# Patient Record
Sex: MALE | Race: WHITE | Employment: STUDENT | URBAN - NONMETROPOLITAN AREA
[De-identification: names, ages, dates, MRNs, and addresses within clinical notes are randomized per-mention and may not be internally consistent; named-entity substitution may affect disease eponyms.]

---
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
## 2024-01-04 ENCOUNTER — TELEMEDICINE (OUTPATIENT)
Dept: FAMILY MEDICINE CLINIC | Age: 19
End: 2024-01-04
Payer: COMMERCIAL

## 2024-01-04 DIAGNOSIS — R00.2 PALPITATIONS: Primary | ICD-10-CM

## 2024-01-04 DIAGNOSIS — R42 DIZZINESS: ICD-10-CM

## 2024-01-04 DIAGNOSIS — D17.9 LIPOMA, UNSPECIFIED SITE: ICD-10-CM

## 2024-01-04 PROCEDURE — G8427 DOCREV CUR MEDS BY ELIG CLIN: HCPCS | Performed by: NURSE PRACTITIONER

## 2024-01-04 PROCEDURE — 99214 OFFICE O/P EST MOD 30 MIN: CPT | Performed by: NURSE PRACTITIONER

## 2024-01-04 ASSESSMENT — ENCOUNTER SYMPTOMS
DIARRHEA: 0
CHEST TIGHTNESS: 0
ABDOMINAL PAIN: 0
VOMITING: 0
CONSTIPATION: 0
EYE DISCHARGE: 0
COUGH: 0
SHORTNESS OF BREATH: 0
RHINORRHEA: 0

## 2024-01-04 NOTE — PROGRESS NOTES
perception normal.         Mood and Affect: Mood and affect normal.         Speech: Speech normal.         Behavior: Behavior normal. Behavior is cooperative.         Thought Content: Thought content normal.         Cognition and Memory: Cognition and memory normal.         Judgment: Judgment normal.                  --SOLO KO - CNP

## 2024-01-22 NOTE — PROGRESS NOTES
history of bleeding disorders or complications with anesthesia.  Patient without any cardiac or lung disorders.  No other questions or concerns noted at this time.     Subjective:      REVIEW OF SYSTEMS:    Pertinent positives as noted in the HPI. All other systems reviewed and negative.    ALLERGIES:  Patient has no known allergies.    Past Medical History:  History reviewed. No pertinent past medical history.    PSM:  Past Surgical History:   Procedure Laterality Date    LIPOMA RESECTION  06/18/2021    excision of right bicep and right forearm lipomas in office-Dr. Crabtree    UPPER GASTROINTESTINAL ENDOSCOPY  10/2020       Family History:       Problem Relation Age of Onset    Diabetes Mother         Insulin resistance    High Cholesterol Mother     Obesity Mother     High Cholesterol Father     Diabetes Maternal Grandmother     Heart Disease Maternal Grandmother     High Blood Pressure Maternal Grandfather     Cancer Paternal Grandmother         uterine    Cancer Paternal Grandfather 63        Stomach       Surgical History:  Past Surgical History:   Procedure Laterality Date    LIPOMA RESECTION  06/18/2021    excision of right bicep and right forearm lipomas in office-Dr. Crabtree    UPPER GASTROINTESTINAL ENDOSCOPY  10/2020        MEDICATIONS:  Current Outpatient Medications   Medication Sig Dispense Refill    fluticasone (FLONASE) 50 MCG/ACT nasal spray 2 sprays by Nasal route daily Apply daily to each nare (Patient not taking: Reported on 1/23/2024) 16 g 0     No current facility-administered medications for this visit.       Objective:   /74   Pulse 94   Temp 99 °F (37.2 °C) (Infrared)   Resp 18   Ht 1.702 m (5' 7\")   Wt 75.1 kg (165 lb 9.6 oz)   SpO2 99%   BMI 25.94 kg/m²     PHYSICAL EXAM  Constitutional: Oriented and cooperative. Appears well-developed and well-nourished. No distress. Voice is not hypo or hyper-nasal in quality. No frequent throat clearing or cough noted.  HENT:   Head:

## 2024-01-23 ENCOUNTER — OFFICE VISIT (OUTPATIENT)
Dept: ENT CLINIC | Age: 19
End: 2024-01-23
Payer: COMMERCIAL

## 2024-01-23 ENCOUNTER — OFFICE VISIT (OUTPATIENT)
Dept: SURGERY | Age: 19
End: 2024-01-23
Payer: COMMERCIAL

## 2024-01-23 VITALS
BODY MASS INDEX: 26.82 KG/M2 | OXYGEN SATURATION: 99 % | HEART RATE: 91 BPM | HEIGHT: 67 IN | SYSTOLIC BLOOD PRESSURE: 118 MMHG | WEIGHT: 170.9 LBS | DIASTOLIC BLOOD PRESSURE: 64 MMHG | RESPIRATION RATE: 16 BRPM | TEMPERATURE: 97.5 F

## 2024-01-23 VITALS
HEART RATE: 94 BPM | WEIGHT: 165.6 LBS | OXYGEN SATURATION: 99 % | RESPIRATION RATE: 18 BRPM | SYSTOLIC BLOOD PRESSURE: 118 MMHG | BODY MASS INDEX: 25.99 KG/M2 | DIASTOLIC BLOOD PRESSURE: 74 MMHG | HEIGHT: 67 IN | TEMPERATURE: 99 F

## 2024-01-23 DIAGNOSIS — R06.5 MOUTH BREATHING: ICD-10-CM

## 2024-01-23 DIAGNOSIS — J34.2 NASAL SEPTAL DEVIATION: ICD-10-CM

## 2024-01-23 DIAGNOSIS — J34.89 NASAL OBSTRUCTION: Primary | ICD-10-CM

## 2024-01-23 DIAGNOSIS — G47.30 SLEEP-DISORDERED BREATHING: ICD-10-CM

## 2024-01-23 DIAGNOSIS — R06.83 SNORING: ICD-10-CM

## 2024-01-23 DIAGNOSIS — D17.9 LIPOMA, UNSPECIFIED SITE: Primary | ICD-10-CM

## 2024-01-23 PROCEDURE — G8427 DOCREV CUR MEDS BY ELIG CLIN: HCPCS | Performed by: REGISTERED NURSE

## 2024-01-23 PROCEDURE — G8419 CALC BMI OUT NRM PARAM NOF/U: HCPCS | Performed by: REGISTERED NURSE

## 2024-01-23 PROCEDURE — G8484 FLU IMMUNIZE NO ADMIN: HCPCS | Performed by: REGISTERED NURSE

## 2024-01-23 PROCEDURE — G8427 DOCREV CUR MEDS BY ELIG CLIN: HCPCS | Performed by: SURGERY

## 2024-01-23 PROCEDURE — G8484 FLU IMMUNIZE NO ADMIN: HCPCS | Performed by: SURGERY

## 2024-01-23 PROCEDURE — 1036F TOBACCO NON-USER: CPT | Performed by: REGISTERED NURSE

## 2024-01-23 PROCEDURE — 1036F TOBACCO NON-USER: CPT | Performed by: SURGERY

## 2024-01-23 PROCEDURE — 99204 OFFICE O/P NEW MOD 45 MIN: CPT | Performed by: REGISTERED NURSE

## 2024-01-23 PROCEDURE — 99213 OFFICE O/P EST LOW 20 MIN: CPT | Performed by: SURGERY

## 2024-01-23 PROCEDURE — G8419 CALC BMI OUT NRM PARAM NOF/U: HCPCS | Performed by: SURGERY

## 2024-01-23 NOTE — PROGRESS NOTES
[facility]  [facility] GENERAL SURGERY  [address]  [place] [address]  [phone]  Established Patient Evaluation in Office    Pt Name: Ender Jacobo  Date of Birth 2005   Today's Date: 1/23/2024  Medical Record Number: [id number]  Referring Provider: No ref. provider found  Primary Care Provider: Ivelisse Dang APRN - CNP  Chief Complaint   Patient presents with    Surgical Consult     Est pt seen 2021 refer KENAN Dang-Multiple lipomas right arm, abdomen, left high, back     ASSESSMENT       Diagnosis Orders   1. Lipoma, unspecified site      right thigh, left thigh, right upper arm, left upper arm, chest, LLQ abdomen, right back      History reviewed. No pertinent past medical history.       PLANS      Schedule Ender for excision of multiple lipomas  The risks complications and benefits of the procedure were discussed with the patient including, but not limited to, infection, bleeding, recurrence, injury to adjacent tissues or organs and open wounds. The patient was given an opportunity to ask questions. Once answered, the patient is agreeable to proceed with the procedure.  Status: outpatient  Planned anesthesia: MAC  He will undergo pre-operative clearance per anesthesia guidelines with risk factors listed under the past medical history diagnosis & problem list.       SUBJECTIVE      Ender is a 18 y.o. male seen in the consultation for evaluation of a subcutaneous masses. The masses were first noted several months ago. The masses are not tender. It gradually enlarging since it was first noticed. He denies fatigue, night sweats, weight loss, recent or current infections, immunosuppression, and personal history of malignancy. Has had previous lipomas removed.  Past Medical History  History reviewed. No pertinent past medical history.  Past Surgical History  Past Surgical History:   Procedure Laterality Date    LIPOMA RESECTION  06/18/2021    excision of right

## 2024-01-26 ENCOUNTER — TELEPHONE (OUTPATIENT)
Dept: SURGERY | Age: 19
End: 2024-01-26

## 2024-01-31 ENCOUNTER — HOSPITAL ENCOUNTER (OUTPATIENT)
Dept: CT IMAGING | Age: 19
Discharge: HOME | End: 2024-01-31
Attending: REGISTERED NURSE
Payer: COMMERCIAL

## 2024-01-31 DIAGNOSIS — J34.2 NASAL SEPTAL DEVIATION: ICD-10-CM

## 2024-01-31 DIAGNOSIS — J34.89 NASAL OBSTRUCTION: ICD-10-CM

## 2024-01-31 DIAGNOSIS — G47.30 SLEEP-DISORDERED BREATHING: ICD-10-CM

## 2024-01-31 DIAGNOSIS — R06.83 SNORING: ICD-10-CM

## 2024-01-31 DIAGNOSIS — R06.5 MOUTH BREATHING: ICD-10-CM

## 2024-01-31 PROCEDURE — 70486 CT MAXILLOFACIAL W/O DYE: CPT

## 2024-02-01 ENCOUNTER — HOSPITAL ENCOUNTER (OUTPATIENT)
Age: 19
Setting detail: SPECIMEN
Discharge: HOME | End: 2024-02-01

## 2024-02-01 ENCOUNTER — NURSE ONLY (OUTPATIENT)
Dept: FAMILY MEDICINE CLINIC | Age: 19
End: 2024-02-01

## 2024-02-01 ENCOUNTER — PATIENT MESSAGE (OUTPATIENT)
Dept: FAMILY MEDICINE CLINIC | Age: 19
End: 2024-02-01

## 2024-02-01 DIAGNOSIS — R42 DIZZINESS: ICD-10-CM

## 2024-02-01 DIAGNOSIS — R00.2 PALPITATIONS: ICD-10-CM

## 2024-02-01 DIAGNOSIS — R44.2 TACTILE HALLUCINATIONS: Primary | ICD-10-CM

## 2024-02-01 NOTE — TELEPHONE ENCOUNTER
From: Ender Jacobo  To: Ivelisse Dang  Sent: 2/1/2024 10:28 AM EST  Subject: Bloodwork?    Ashley Oviedo, I was just wanting to reach out and say I’ve still had some ongoing symptoms since we’ve last talked on the virtual appointment. I still feel foggy most days, fatigued more often, some days very dizzy and other days just slightly. The only thing that’s gotten better was I haven’t had any headaches the past few weeks. I was curious if maybe we could get some blood work done to see if there might be a problem stemming from there? A deficiency possibly or just to make sure there are no signs of cancer being a possibility. Thanks!

## 2024-02-02 LAB
ALBUMIN SERPL-MCNC: 4.6 G/DL (ref 3.5–5.2)
ALBUMIN/GLOB SERPL: 2 {RATIO} (ref 1–2.5)
ALP SERPL-CCNC: 90 U/L (ref 40–129)
ALT SERPL-CCNC: 16 U/L (ref 10–50)
ANION GAP SERPL CALCULATED.3IONS-SCNC: 11 MMOL/L (ref 9–16)
AST SERPL-CCNC: 25 U/L (ref 10–50)
BASOPHILS # BLD: 0.05 K/UL (ref 0–0.2)
BASOPHILS NFR BLD: 1 % (ref 0–2)
BILIRUB SERPL-MCNC: 0.5 MG/DL (ref 0–1.2)
BUN SERPL-MCNC: 12 MG/DL (ref 6–20)
CALCIUM SERPL-MCNC: 9.6 MG/DL (ref 8.6–10.4)
CHLORIDE SERPL-SCNC: 104 MMOL/L (ref 98–107)
CO2 SERPL-SCNC: 24 MMOL/L (ref 20–31)
CREAT SERPL-MCNC: 1.2 MG/DL (ref 0.7–1.2)
EOSINOPHIL # BLD: 0.1 K/UL (ref 0–0.44)
EOSINOPHILS RELATIVE PERCENT: 1 % (ref 1–4)
ERYTHROCYTE [DISTWIDTH] IN BLOOD BY AUTOMATED COUNT: 12.8 % (ref 11.8–14.4)
GFR SERPL CREATININE-BSD FRML MDRD: >60 ML/MIN/1.73M2
GLUCOSE SERPL-MCNC: 95 MG/DL (ref 74–99)
HCT VFR BLD AUTO: 46.6 % (ref 40.7–50.3)
HGB BLD-MCNC: 15.5 G/DL (ref 13–17)
IMM GRANULOCYTES # BLD AUTO: <0.03 K/UL (ref 0–0.3)
IMM GRANULOCYTES NFR BLD: 0 %
LYMPHOCYTES NFR BLD: 2.9 K/UL (ref 1.2–5.2)
LYMPHOCYTES RELATIVE PERCENT: 36 % (ref 25–45)
MCH RBC QN AUTO: 27.6 PG (ref 25.2–33.5)
MCHC RBC AUTO-ENTMCNC: 33.3 G/DL (ref 28.4–34.8)
MCV RBC AUTO: 82.9 FL (ref 82.6–102.9)
MONOCYTES NFR BLD: 0.6 K/UL (ref 0.1–1.4)
MONOCYTES NFR BLD: 8 % (ref 2–8)
NEUTROPHILS NFR BLD: 54 % (ref 34–64)
NEUTS SEG NFR BLD: 4.36 K/UL (ref 1.8–8)
NRBC BLD-RTO: 0 PER 100 WBC
PLATELET # BLD AUTO: 305 K/UL (ref 138–453)
PMV BLD AUTO: 10.4 FL (ref 8.1–13.5)
POTASSIUM SERPL-SCNC: 4.1 MMOL/L (ref 3.7–5.3)
PROT SERPL-MCNC: 7.2 G/DL (ref 6.6–8.7)
RBC # BLD AUTO: 5.62 M/UL (ref 4.21–5.77)
SODIUM SERPL-SCNC: 139 MMOL/L (ref 136–145)
TSH SERPL DL<=0.05 MIU/L-ACNC: 2.17 UIU/ML (ref 0.27–4.2)
WBC OTHER # BLD: 8 K/UL (ref 4.5–13.5)

## 2024-02-07 ENCOUNTER — PATIENT MESSAGE (OUTPATIENT)
Dept: FAMILY MEDICINE CLINIC | Age: 19
End: 2024-02-07

## 2024-02-07 ENCOUNTER — OFFICE VISIT (OUTPATIENT)
Dept: FAMILY MEDICINE CLINIC | Age: 19
End: 2024-02-07
Payer: COMMERCIAL

## 2024-02-07 VITALS
BODY MASS INDEX: 26.95 KG/M2 | SYSTOLIC BLOOD PRESSURE: 110 MMHG | WEIGHT: 172.1 LBS | TEMPERATURE: 97.9 F | DIASTOLIC BLOOD PRESSURE: 74 MMHG | RESPIRATION RATE: 16 BRPM | OXYGEN SATURATION: 96 % | HEART RATE: 92 BPM

## 2024-02-07 DIAGNOSIS — R09.81 NASAL CONGESTION: Primary | ICD-10-CM

## 2024-02-07 DIAGNOSIS — R68.2 DRY MOUTH: ICD-10-CM

## 2024-02-07 PROCEDURE — G8427 DOCREV CUR MEDS BY ELIG CLIN: HCPCS | Performed by: STUDENT IN AN ORGANIZED HEALTH CARE EDUCATION/TRAINING PROGRAM

## 2024-02-07 PROCEDURE — G8484 FLU IMMUNIZE NO ADMIN: HCPCS | Performed by: STUDENT IN AN ORGANIZED HEALTH CARE EDUCATION/TRAINING PROGRAM

## 2024-02-07 PROCEDURE — G8419 CALC BMI OUT NRM PARAM NOF/U: HCPCS | Performed by: STUDENT IN AN ORGANIZED HEALTH CARE EDUCATION/TRAINING PROGRAM

## 2024-02-07 PROCEDURE — 1036F TOBACCO NON-USER: CPT | Performed by: STUDENT IN AN ORGANIZED HEALTH CARE EDUCATION/TRAINING PROGRAM

## 2024-02-07 PROCEDURE — 99213 OFFICE O/P EST LOW 20 MIN: CPT | Performed by: STUDENT IN AN ORGANIZED HEALTH CARE EDUCATION/TRAINING PROGRAM

## 2024-02-07 RX ORDER — LORATADINE 10 MG/1
10 TABLET ORAL DAILY
Qty: 30 TABLET | Refills: 0 | Status: SHIPPED | OUTPATIENT
Start: 2024-02-07 | End: 2024-03-08

## 2024-02-07 RX ORDER — FLUTICASONE PROPIONATE 50 MCG
2 SPRAY, SUSPENSION (ML) NASAL DAILY
Qty: 16 G | Refills: 0 | Status: SHIPPED | OUTPATIENT
Start: 2024-02-07

## 2024-02-07 ASSESSMENT — PATIENT HEALTH QUESTIONNAIRE - PHQ9
SUM OF ALL RESPONSES TO PHQ QUESTIONS 1-9: 0
SUM OF ALL RESPONSES TO PHQ QUESTIONS 1-9: 0
1. LITTLE INTEREST OR PLEASURE IN DOING THINGS: 0
SUM OF ALL RESPONSES TO PHQ QUESTIONS 1-9: 0
2. FEELING DOWN, DEPRESSED OR HOPELESS: 0
SUM OF ALL RESPONSES TO PHQ QUESTIONS 1-9: 0
SUM OF ALL RESPONSES TO PHQ9 QUESTIONS 1 & 2: 0

## 2024-02-07 ASSESSMENT — ENCOUNTER SYMPTOMS
COUGH: 0
SHORTNESS OF BREATH: 0
EYE PAIN: 0
CONSTIPATION: 0
SINUS PRESSURE: 1
RHINORRHEA: 0
NAUSEA: 0
EYE REDNESS: 0
DIARRHEA: 0
ABDOMINAL PAIN: 0
VOMITING: 0

## 2024-02-07 NOTE — TELEPHONE ENCOUNTER
From: Ender Jacobo  To: Ivelisse Dang  Sent: 2/7/2024 2:17 PM EST  Subject: Some Symptoms still    I’ve now woke up to what I think is a swollen lymph node on the left side of my neck. It’s almost somewhat gagging me at some points. I’m still having ringing in my right ear and still slightly dizzy.

## 2024-02-07 NOTE — PROGRESS NOTES
Ender Jacobo (:  2005) is a 18 y.o. male,Established patient, here for evaluation of the following chief complaint(s):  Swelling (Swollen lymph node on the left of neck. All symptoms have been going on for a few weeks ), Shortness of Breath (Onset x with gagging. Thinks this is swelling ), and Tinnitus (Left ear - onset x 2 weeks ago )         ASSESSMENT/PLAN:  1. Nasal congestion  -     fluticasone (FLONASE) 50 MCG/ACT nasal spray; 2 sprays by Nasal route daily Apply daily to each nare, Disp-16 g, R-0Normal  -     loratadine (CLARITIN) 10 MG tablet; Take 1 tablet by mouth daily, Disp-30 tablet, R-0Normal  2. Dry mouth  18-year-old male presents the office to follow-up on concerns of lymph node swelling in left side of his neck that started today as well as intermittent shortness of breath with gagging and swelling of his sinuses with left-sided ear ringing.  Etiology patient symptoms consistent with chronic sinus findings that were seen on CT scan of his sinuses that were reviewed in detail with patient. Most recent blood work benign including thyroid function.  Patient was educated on starting Flonase, Claritin daily since he did not start this from when ENT recommended this.  Plan to start as scheduled and follow-up with ENT as scheduled next 2 to 3 weeks.  No acute concerns at this time.  Small swelling on left neck consistent with benign cervical lymph node. patient was educated on taking frequent drinks of water and prevent dehydration to treat dry mouth.      Return if symptoms worsen or fail to improve.         Subjective   SUBJECTIVE/OBJECTIVE:  18-year-old male presents the office for concerns of swelling in the left side of his neck.  Patient states he had a lymph node swelling in the left side of his neck for the past 2 weeks or so with left ear ringing during this time from. Patient states that he has experienced some shortness of breath and trouble swallowing because of this. Patient is

## 2024-02-13 ENCOUNTER — PREP FOR PROCEDURE (OUTPATIENT)
Dept: SURGERY | Age: 19
End: 2024-02-13

## 2024-02-13 RX ORDER — SODIUM CHLORIDE 0.9 % (FLUSH) 0.9 %
5-40 SYRINGE (ML) INJECTION PRN
Status: CANCELLED | OUTPATIENT
Start: 2024-02-13

## 2024-02-13 RX ORDER — SODIUM CHLORIDE 9 MG/ML
INJECTION, SOLUTION INTRAVENOUS PRN
Status: CANCELLED | OUTPATIENT
Start: 2024-02-13

## 2024-02-13 RX ORDER — SODIUM CHLORIDE 9 MG/ML
INJECTION, SOLUTION INTRAVENOUS CONTINUOUS
Status: CANCELLED | OUTPATIENT
Start: 2024-02-13

## 2024-02-13 RX ORDER — SODIUM CHLORIDE 0.9 % (FLUSH) 0.9 %
5-40 SYRINGE (ML) INJECTION EVERY 12 HOURS SCHEDULED
Status: CANCELLED | OUTPATIENT
Start: 2024-02-13

## 2024-02-14 NOTE — H&P
[facility]  [facility] GENERAL SURGERY  [address]  [place] [address]  [phone]  Established Patient Evaluation in Office    Pt Name: Ender Jacobo  Date of Birth 2005   Today's Date: 1/23/2024  Medical Record Number: [id number]  Referring Provider: No ref. provider found  Primary Care Provider: Ivelisse Dang APRN - CNP  Chief Complaint   Patient presents with    Surgical Consult     Est pt seen 2021 refer KENAN Dang-Multiple lipomas right arm, abdomen, left high, back     ASSESSMENT       Diagnosis Orders   1. Lipoma, unspecified site      right thigh, left thigh, right upper arm, left upper arm, chest, LLQ abdomen, right back      History reviewed. No pertinent past medical history.       PLANS      Schedule Ender for excision of multiple lipomas  The risks complications and benefits of the procedure were discussed with the patient including, but not limited to, infection, bleeding, recurrence, injury to adjacent tissues or organs and open wounds. The patient was given an opportunity to ask questions. Once answered, the patient is agreeable to proceed with the procedure.  Status: outpatient  Planned anesthesia: MAC  He will undergo pre-operative clearance per anesthesia guidelines with risk factors listed under the past medical history diagnosis & problem list.       SUBJECTIVE      Ender is a 18 y.o. male seen in the consultation for evaluation of a subcutaneous masses. The masses were first noted several months ago. The masses are not tender. It gradually enlarging since it was first noticed. He denies fatigue, night sweats, weight loss, recent or current infections, immunosuppression, and personal history of malignancy. Has had previous lipomas removed.  Past Medical History  History reviewed. No pertinent past medical history.  Past Surgical History  Past Surgical History:   Procedure Laterality Date    LIPOMA RESECTION  06/18/2021    excision of right  cooperative to examination with proper mood and affect.  SKIN: Skin color, texture, turgor normal. 1 cm lipomas present on right and left upper arm, chest, LLQ abdomen, right and left thigh.   HEENT: Head is normocephalic, atraumatic. EOMI, PERRLA.  NECK: Supple, symmetrical, trachea midline, thyroid symmetric, not enlarged and no tenderness, skin normal.  CHEST/LUNGS: chest symmetric with normal A/P diameter, normal respiratory rate and rhythm, lungs clear to auscultation without wheezes, rales or rhonchi. No accessory muscle use. Scars None   CARDIOVASCULAR: Heart sounds are normal.  Regular rate and rhythm without murmur, gallop or rub. Normal S1 and S2. Carotid and femoral pulses 2+/4 and equal bilaterally.  ABDOMEN: Normal shape. No scar(s) present. Normal bowel sounds.  No bruits. soft, nontender, nondistended, no masses or organomegaly. no evidence of hernia. Percussion: Normal without hepatosplenomegally.   RECTAL: deferred, not clinically indicated  NEUROLOGIC: There are no focalizing motor or sensory deficits. CN II-XII are grossly intact..   EXTREMITIES: no cyanosis, no clubbing, and no edema.  LYMPH: No cervical or inguinal lymphadenopathy.    Thank you for the interesting evaluation. Further recommendations as listed above.       Electronically signed by Mendoza Crabtree DO on 1/23/2024 at 12:07 PM      DO [facility] GENERAL SURGERY  History and Physical Update    Pt Name: Ender Jacobo  MRN: [id number]  YOB: 2005  Date of evaluation: 2/15/2024    I have examined the patient and reviewed the H&P/Consult and there are no changes to the patient or plans.         Electronically signed by Mendoza Crabtree DO on 2/15/2024 at 8:39 AM

## 2024-02-14 NOTE — OP NOTE
[facility]  RECORD OF OPERATION  PATIENT NAME: Ender Jacobo  MEDICAL RECORD NO. [id number]  SURGEON: TAMIKA Lane  Primary Care Physician: Ivelisse Dang APRN - CNP     PROCEDURE PERFORMED:  2/15/2024  PREOPERATIVE DIAGNOSIS:  Pre-Op Diagnosis Codes:     * Multiple lipomas [D17.9]   POSTOPERATIVE DIAGNOSIS: Same, path pending  PROCEDURE PERFORMED:  Excision of 1 cm lumbar lipoma, 1 cm LLQ abdominal wall lipoma, 1 cm left forearm, `1 cm left thigh, and two 1 cm lipomas right thigh   SURGEON:  Dr. Mendoza Crabtree.  ANESTHESIA:  MAC and local   ESTIMATED BLOOD LOSS:  5 ml  SPECIMEN:   lipomas sent to pathology for analysis  COMPLICATIONS:  None immediately appreciated.     DISCUSSION: Ender is a 18 y.o. year old male who presented to my office seen in evaluation at request of Ivelisse Dang APRN - CNP regarding a masses located at the above locations that have been enlarging.. After history and physical examination was performed potential diagnostic and therapeutic modalities discussed with the patient. Operative and non operative management was discussed. He was given opportunity to ask questions. Once answered informed consent was obtained. He was brought to operating room on 2/15/2024 for procedure.     OPERATIVE FINDINGS:  At time of exploration, several 1 cm lipomas were removed.     PROCEDURE:  The patient was brought to the operating room and placed in left lateral position. Placed under continuous cardiac telemetry, blood pressure, pulse oximetry monitoring and placed under MAC anesthesia by anesthesia department. The skin overlying the lesion was prepped and draped in sterile fashion. Skin edges was infiltrated with local anesthetic. A transverse incision made with a #10 scalpel blade and carried down to subcutaneous tissues. The mass was removed using a combination of sharp dissection and electrocautery. The skin closed using 3-0 Vicryl suture. The wound was then

## 2024-02-14 NOTE — DISCHARGE INSTRUCTIONS
DR. WELLS'S DISCHARGE INSTRUCTIONS    Pt Name: Ender Jacobo  Medical Record Number: [id number]  Today's Date: 2/15/2024  GENERAL ANESTHESIA OR SEDATION   1. Do not drive or operate hazardous machinery for 24 hours.  2. Do not make important business or personal decisions for 24 hours.  3. Do not drink alcoholic beverages or use tobacco for 24 hours.  ACTIVITY INSTRUCTIONS   You may resume normal to light activity tomorrow. Do not participate in activity that may place stress on your incisions.     DIET INSTRUCTIONS   Regular diet as tolerated.  MEDICATIONS   Prescription written for Norco. Take as directed.  Do not drink alcohol or drive while taking pain medications. You may experience dizziness or drowsiness with these medications. You may also experience constipation which can be relieved with stool softners or laxatives.  You may resume your daily prescription medication schedule unless otherwise specified.  Do not take 325mg Aspirin or other blood thinners such as Coumadin or Plavix for 5 days.  WOUND & DRESSING INSTRUCTIONS   Always ensure you and your care giver clean hands before and after caring for the wound.  Keep dressing clean and dry for 48 hours. Change when soiled or wet.      Ice operative site for 20 minutes 4 times a day.     May wash over incision in shower in 48 hours, but do not soak in a bath.  ABDOMINAL & LAPAROSCOPIC PROCEDURES   1. You are encouraged to get up and move around as this helps with the circulation and speeds up the healing process.  2. Breath deeply and cough from time to time. This helps to clear your lungs and helps prevent pneumonia.  3. Supporting your incision with a pillow or your hand helps to minimize discomfort and pain.  FOLLOW UP CARE, SPECIFICALLY WATCH FOR:    Fever over 101 degrees by mouth   Increased redness, warmth, hardness at operative site.   Blood soaked dressing (small amounts of oozing may be normal.)   Increased or progressive drainage from the

## 2024-02-15 ENCOUNTER — HOSPITAL ENCOUNTER (OUTPATIENT)
Age: 19
Setting detail: OUTPATIENT SURGERY
Discharge: HOME | End: 2024-02-15
Attending: SURGERY | Admitting: SURGERY
Payer: COMMERCIAL

## 2024-02-15 ENCOUNTER — ANESTHESIA (OUTPATIENT)
Dept: OPERATING ROOM | Age: 19
End: 2024-02-15
Payer: COMMERCIAL

## 2024-02-15 ENCOUNTER — ANESTHESIA EVENT (OUTPATIENT)
Dept: OPERATING ROOM | Age: 19
End: 2024-02-15
Payer: COMMERCIAL

## 2024-02-15 VITALS
SYSTOLIC BLOOD PRESSURE: 125 MMHG | WEIGHT: 165.4 LBS | DIASTOLIC BLOOD PRESSURE: 70 MMHG | HEART RATE: 86 BPM | RESPIRATION RATE: 16 BRPM | HEIGHT: 68 IN | OXYGEN SATURATION: 98 % | TEMPERATURE: 97.5 F | BODY MASS INDEX: 25.07 KG/M2

## 2024-02-15 DIAGNOSIS — G89.18 ACUTE POSTOPERATIVE PAIN: Primary | ICD-10-CM

## 2024-02-15 DIAGNOSIS — D17.9 MULTIPLE LIPOMAS: ICD-10-CM

## 2024-02-15 PROCEDURE — 6360000002 HC RX W HCPCS: Performed by: NURSE ANESTHETIST, CERTIFIED REGISTERED

## 2024-02-15 PROCEDURE — 27327 EXC THIGH/KNEE LES SC < 3 CM: CPT | Performed by: SURGERY

## 2024-02-15 PROCEDURE — 3700000001 HC ADD 15 MINUTES (ANESTHESIA): Performed by: SURGERY

## 2024-02-15 PROCEDURE — 22902 EXC ABD LES SC < 3 CM: CPT | Performed by: SURGERY

## 2024-02-15 PROCEDURE — 6360000002 HC RX W HCPCS: Performed by: SURGERY

## 2024-02-15 PROCEDURE — 2500000003 HC RX 250 WO HCPCS: Performed by: SURGERY

## 2024-02-15 PROCEDURE — 88304 TISSUE EXAM BY PATHOLOGIST: CPT

## 2024-02-15 PROCEDURE — 7100000011 HC PHASE II RECOVERY - ADDTL 15 MIN: Performed by: SURGERY

## 2024-02-15 PROCEDURE — 3700000000 HC ANESTHESIA ATTENDED CARE: Performed by: SURGERY

## 2024-02-15 PROCEDURE — 3600000012 HC SURGERY LEVEL 2 ADDTL 15MIN: Performed by: SURGERY

## 2024-02-15 PROCEDURE — 2709999900 HC NON-CHARGEABLE SUPPLY: Performed by: SURGERY

## 2024-02-15 PROCEDURE — 3600000002 HC SURGERY LEVEL 2 BASE: Performed by: SURGERY

## 2024-02-15 PROCEDURE — 21930 EXC BACK LES SC < 3 CM: CPT | Performed by: SURGERY

## 2024-02-15 PROCEDURE — 7100000010 HC PHASE II RECOVERY - FIRST 15 MIN: Performed by: SURGERY

## 2024-02-15 PROCEDURE — 25071 EXC FOREARM LES SC 3 CM/>: CPT | Performed by: SURGERY

## 2024-02-15 PROCEDURE — 2580000003 HC RX 258: Performed by: SURGERY

## 2024-02-15 RX ORDER — PROPOFOL 10 MG/ML
INJECTION, EMULSION INTRAVENOUS PRN
Status: DISCONTINUED | OUTPATIENT
Start: 2024-02-15 | End: 2024-02-15 | Stop reason: SDUPTHER

## 2024-02-15 RX ORDER — FENTANYL CITRATE 50 UG/ML
INJECTION, SOLUTION INTRAMUSCULAR; INTRAVENOUS PRN
Status: DISCONTINUED | OUTPATIENT
Start: 2024-02-15 | End: 2024-02-15 | Stop reason: SDUPTHER

## 2024-02-15 RX ORDER — SODIUM CHLORIDE 9 MG/ML
INJECTION, SOLUTION INTRAVENOUS CONTINUOUS
Status: DISCONTINUED | OUTPATIENT
Start: 2024-02-15 | End: 2024-02-15 | Stop reason: HOSPADM

## 2024-02-15 RX ORDER — ONDANSETRON 2 MG/ML
4 INJECTION INTRAMUSCULAR; INTRAVENOUS EVERY 6 HOURS PRN
Status: DISCONTINUED | OUTPATIENT
Start: 2024-02-15 | End: 2024-02-15 | Stop reason: HOSPADM

## 2024-02-15 RX ORDER — SODIUM CHLORIDE 9 MG/ML
INJECTION, SOLUTION INTRAVENOUS PRN
Status: DISCONTINUED | OUTPATIENT
Start: 2024-02-15 | End: 2024-02-15 | Stop reason: HOSPADM

## 2024-02-15 RX ORDER — HYDROCODONE BITARTRATE AND ACETAMINOPHEN 5; 325 MG/1; MG/1
1 TABLET ORAL EVERY 4 HOURS PRN
Status: DISCONTINUED | OUTPATIENT
Start: 2024-02-15 | End: 2024-02-15 | Stop reason: HOSPADM

## 2024-02-15 RX ORDER — SODIUM CHLORIDE 0.9 % (FLUSH) 0.9 %
5-40 SYRINGE (ML) INJECTION PRN
Status: DISCONTINUED | OUTPATIENT
Start: 2024-02-15 | End: 2024-02-15 | Stop reason: HOSPADM

## 2024-02-15 RX ORDER — SODIUM CHLORIDE 0.9 % (FLUSH) 0.9 %
5-40 SYRINGE (ML) INJECTION EVERY 12 HOURS SCHEDULED
Status: DISCONTINUED | OUTPATIENT
Start: 2024-02-15 | End: 2024-02-15 | Stop reason: HOSPADM

## 2024-02-15 RX ORDER — HYDROCODONE BITARTRATE AND ACETAMINOPHEN 5; 325 MG/1; MG/1
1 TABLET ORAL EVERY 6 HOURS PRN
Qty: 20 TABLET | Refills: 0 | Status: SHIPPED | OUTPATIENT
Start: 2024-02-15 | End: 2024-02-20

## 2024-02-15 RX ORDER — LIDOCAINE HYDROCHLORIDE 10 MG/ML
INJECTION, SOLUTION EPIDURAL; INFILTRATION; INTRACAUDAL; PERINEURAL PRN
Status: DISCONTINUED | OUTPATIENT
Start: 2024-02-15 | End: 2024-02-15 | Stop reason: ALTCHOICE

## 2024-02-15 RX ORDER — MIDAZOLAM HYDROCHLORIDE 1 MG/ML
INJECTION INTRAMUSCULAR; INTRAVENOUS PRN
Status: DISCONTINUED | OUTPATIENT
Start: 2024-02-15 | End: 2024-02-15 | Stop reason: SDUPTHER

## 2024-02-15 RX ADMIN — PROPOFOL 450 MG: 10 INJECTION, EMULSION INTRAVENOUS at 09:42

## 2024-02-15 RX ADMIN — SODIUM CHLORIDE: 9 INJECTION, SOLUTION INTRAVENOUS at 09:38

## 2024-02-15 RX ADMIN — MIDAZOLAM 2 MG: 1 INJECTION INTRAMUSCULAR; INTRAVENOUS at 09:40

## 2024-02-15 RX ADMIN — Medication 2000 MG: at 09:42

## 2024-02-15 RX ADMIN — FENTANYL CITRATE 100 MCG: 50 INJECTION, SOLUTION INTRAMUSCULAR; INTRAVENOUS at 09:40

## 2024-02-15 ASSESSMENT — PAIN - FUNCTIONAL ASSESSMENT
PAIN_FUNCTIONAL_ASSESSMENT: 0-10
PAIN_FUNCTIONAL_ASSESSMENT: NONE - DENIES PAIN

## 2024-02-15 NOTE — ANESTHESIA PRE PROCEDURE
Department of Anesthesiology  Preprocedure Note       Name:  Ender Jacobo   Age:  18 y.o.  :  2005                                          MRN:  [id number]         Date:  2/15/2024      Surgeon: Surgeon(s):  Mendoza Crabtree DO    Procedure: Procedure(s):  Excision Lipomas Right Thigh, Left thigh, Right Upperarm, Left Upperarm, Chest, Left Lower Quadrant, Right Back    Medications prior to admission:   Prior to Admission medications    Medication Sig Start Date End Date Taking? Authorizing Provider   fluticasone (FLONASE) 50 MCG/ACT nasal spray 2 sprays by Nasal route daily Apply daily to each nare 24   Nicho Hernandez DO   loratadine (CLARITIN) 10 MG tablet Take 1 tablet by mouth daily 2/7/24 3/8/24  Nicho Hernandez DO       Current medications:    Current Facility-Administered Medications   Medication Dose Route Frequency Provider Last Rate Last Admin   • 0.9 % sodium chloride infusion   IntraVENous Continuous Mendoza Crabtree DO       • sodium chloride flush 0.9 % injection 5-40 mL  5-40 mL IntraVENous 2 times per day Mendoza Crabtree DO       • sodium chloride flush 0.9 % injection 5-40 mL  5-40 mL IntraVENous PRN Mendoza Crabtree DO       • 0.9 % sodium chloride infusion   IntraVENous PRN Mendoza Crabtree DO       • ceFAZolin (ANCEF) 2000 mg in 0.9% sodium chloride 50 mL IVPB  2,000 mg IntraVENous On Call to OR Mendoza Crabtree DO           Allergies:  No Known Allergies    Problem List:    Patient Active Problem List   Diagnosis Code   • Chest pain R07.9   • H/O headache Z87.898       Past Medical History:  History reviewed. No pertinent past medical history.    Past Surgical History:        Procedure Laterality Date   • LIPOMA RESECTION  2021    excision of right bicep and right forearm lipomas in office-Dr. Crabtree   • UPPER GASTROINTESTINAL ENDOSCOPY  10/2020       Social History:    Social History     Tobacco Use   • Smoking status: Never   • Smokeless tobacco: Never

## 2024-02-15 NOTE — PROGRESS NOTES
1029-Patient to Phase II in chair. Report received from Rochelle GLOVER. Patient drowsy but responsive. Denies pain and nausea. Vitals obtained and stable. Respirations even and unlabored on room air. Patient instructed to stay in chair. Parents in room. Instructed on call light use. 5 incisional sites intact with surgical glue. No drainage noted.  1055-Patient provided with snack and drink. Discharge instructions reviewed with family and patient verbalized understanding. Patient resting in chair.   1110-Patient denies needs. Continues to be drowsy but responding to command. Resting in chair.   1130-Patient getting dressed.   1200-Patient meets discharge criteria.  Discharged in stable condition with responsible . All belongings given to patient. Patient ambulated to car with assistance from RN. Patient tolerated well.

## 2024-02-15 NOTE — ANESTHESIA POSTPROCEDURE EVALUATION
Department of Anesthesiology  Postprocedure Note    Patient: Ender Jacobo  MRN: [id number]  YOB: 2005  Date of evaluation: 2/15/2024    Procedure Summary       Date: 02/15/24 Room / Location: [address]    Anesthesia Start: 0938 Anesthesia Stop: 1028    Procedure: Excision Lipomas Right Thigh, Left thigh, Right Upperarm, Left Upperarm, Chest, Left Lower Quadrant, Right Back (Bilateral) Diagnosis:       Multiple lipomas      (Multiple lipomas [D17.9])    Surgeons: Mendoza Crabtree DO Responsible Provider: Dashawn Kaur MD    Anesthesia Type: MAC ASA Status: 1            Anesthesia Type: No value filed.    Adina Phase I:      Adina Phase II: Adina Score: 9    Anesthesia Post Evaluation    Patient location during evaluation: bedside  Patient participation: complete - patient participated  Level of consciousness: awake  Airway patency: patent  Nausea & Vomiting: no vomiting and no nausea  Cardiovascular status: hemodynamically stable  Respiratory status: acceptable  Hydration status: stable  Pain management: adequate    No notable events documented.

## 2024-02-16 ENCOUNTER — TELEPHONE (OUTPATIENT)
Dept: SURGERY | Age: 19
End: 2024-02-16

## 2024-02-16 NOTE — TELEPHONE ENCOUNTER
Called pt for S/P  Excision of 1 cm lumbar lipoma, 1 cm LLQ abdominal wall lipoma, 1 cm left forearm, `1 cm left thigh, and two 1 cm lipomas right thigh -2/15/24.  Pt stated no concerns at this time. Advised if taking any pain meds to add a stool softener with it. Pt notified of f/u appt time and date.  Advised to call if any questions or concerns.

## 2024-02-21 ENCOUNTER — OFFICE VISIT (OUTPATIENT)
Dept: ENT CLINIC | Age: 19
End: 2024-02-21
Payer: COMMERCIAL

## 2024-02-21 VITALS
WEIGHT: 168.3 LBS | DIASTOLIC BLOOD PRESSURE: 82 MMHG | HEART RATE: 102 BPM | BODY MASS INDEX: 25.51 KG/M2 | TEMPERATURE: 97 F | OXYGEN SATURATION: 99 % | RESPIRATION RATE: 20 BRPM | HEIGHT: 68 IN | SYSTOLIC BLOOD PRESSURE: 122 MMHG

## 2024-02-21 DIAGNOSIS — J34.89 NASAL OBSTRUCTION: Primary | ICD-10-CM

## 2024-02-21 DIAGNOSIS — J34.89 NASAL SEPTAL SPUR: ICD-10-CM

## 2024-02-21 DIAGNOSIS — R06.83 LOUD SNORING: ICD-10-CM

## 2024-02-21 DIAGNOSIS — J34.89 CONCHA BULLOSA: ICD-10-CM

## 2024-02-21 DIAGNOSIS — R06.5 MOUTH BREATHING: ICD-10-CM

## 2024-02-21 DIAGNOSIS — J34.3 HYPERTROPHY OF INFERIOR NASAL TURBINATE: ICD-10-CM

## 2024-02-21 DIAGNOSIS — J34.2 NASAL SEPTAL DEVIATION: ICD-10-CM

## 2024-02-21 DIAGNOSIS — G47.30 SLEEP-DISORDERED BREATHING: ICD-10-CM

## 2024-02-21 DIAGNOSIS — R44.8 FACIAL PRESSURE: ICD-10-CM

## 2024-02-21 PROCEDURE — G8484 FLU IMMUNIZE NO ADMIN: HCPCS | Performed by: REGISTERED NURSE

## 2024-02-21 PROCEDURE — 1036F TOBACCO NON-USER: CPT | Performed by: REGISTERED NURSE

## 2024-02-21 PROCEDURE — G8419 CALC BMI OUT NRM PARAM NOF/U: HCPCS | Performed by: REGISTERED NURSE

## 2024-02-21 PROCEDURE — 99214 OFFICE O/P EST MOD 30 MIN: CPT | Performed by: REGISTERED NURSE

## 2024-02-21 PROCEDURE — G8427 DOCREV CUR MEDS BY ELIG CLIN: HCPCS | Performed by: REGISTERED NURSE

## 2024-02-21 NOTE — PROGRESS NOTES
[facility] [place] [facility]  [facility] EAR, NOSE AND THROAT  [address]  [address]  [facility] [address]  Dept: [phone]  Dept Fax: [phone]  Loc: [phone]    Ender Jacobo is a 18 y.o. male who was referred by No ref. provider found for:  Chief Complaint   Patient presents with    Follow-up     Patient is here for a CT review. Patient states he has been using the nasal spray and states that is has cleared a lot of his stuff up besides his actual nose.   .    HPI:     Current visit documentation 02/21/2024:  Ender Jacobo presents today for CT review.  He is accompanied by mother who assist with obtaining HPI today.  Patient states that he has utilized the prescribed regimen of nasal steroid spray and states that it has not resolved any of his nasal obstructive symptoms and he is still dealing with significant difficulty breathing through his nose.  Patient describes that he also has history of significant facial pressure and pain surrounding bilateral eyes that is worse in the morning and with weather changes. He does report history of tonsil stones with associated halitosis and mother describes prolonged history of recurrent strep throat approximately once per year since childhood.  Patient denies any concern with difficulty swallowing.  No personal or family history of bleeding disorders or complications with anesthesia.  Patient without personal history of cardiac or lung disorders.  Patient states that he has extremely ready to improve his nasal airway to improve his quality of sleep and impact on his daily life.  Mother reports that after CT review she has a greater understanding of all of patient's symptoms and describes that everything is starting to make sense. Patient does describe history of dental problems with right wisdom tooth getting extraction in the future. Reviewed this in conjunction with maxillary sinus cyst findings on imaging. No other questions or

## 2024-03-01 ENCOUNTER — OFFICE VISIT (OUTPATIENT)
Dept: SURGERY | Age: 19
End: 2024-03-01

## 2024-03-01 VITALS
DIASTOLIC BLOOD PRESSURE: 60 MMHG | HEIGHT: 68 IN | OXYGEN SATURATION: 99 % | HEART RATE: 90 BPM | TEMPERATURE: 97.7 F | WEIGHT: 168.6 LBS | RESPIRATION RATE: 16 BRPM | BODY MASS INDEX: 25.55 KG/M2 | SYSTOLIC BLOOD PRESSURE: 118 MMHG

## 2024-03-01 DIAGNOSIS — D17.9 MULTIPLE LIPOMAS: Primary | ICD-10-CM

## 2024-03-01 PROCEDURE — 99024 POSTOP FOLLOW-UP VISIT: CPT | Performed by: SURGERY

## 2024-03-01 NOTE — PROGRESS NOTES
[facility]. [facility]  [facility] GENERAL SUR[facility]ERY  [address]  [place] [address]  [phone]  Post Procedure Evaluation in Office    Pt Name: Ender Jacobo  Date of Birth 2005   Today's Date: 3/1/2024  Medical Record Number: [id number]  Referring Provider: No ref. provider found  Primary Care Provider: Ivelisse Dang APRN - CAND[facility]  Chief Complaint   Patient presents with    Follow Up After Procedure     s/p--Excision of 1 cm lumbar lipoma, 1 cm LLQ abdominal wall lipoma, 1 cm left forearm, `1 cm left thigh, and two 1 cm lipomas right thigh--2/15/24       ASSESSMENT       Diagnosis Orders   1. Multiple lipomas      S/p excision 2/15/24      Incision is clean, dry and intact or healing as expected   PLANS      Pathology reviewed with the patient who understands. All questions were answered.  Patient Instructions   May return to full activity without restrictions.   Follow up: Return for As needed. Instructed to call if any concerns.      SUBJECTIVE      Ender is seen today for post-op follow-up. He is S/P lipoma excisions 2/15/24. Symptoms and activity have gradually improved compared to preoperative. The surgical site is clean and has no drainage. Pain is controlled without any narcotic pain medications. He has compliant with postoperative instructions.  Past Medical History   has no past medical history on file.  Past Surgical History   has a past surgical history that includes Upper gastrointestinal endoscopy (10/2020); lipoma resection (06/18/2021); and Leg biopsy excision (Bilateral, 2/15/2024).  Medications  has a current medication list which includes the following prescription(s): fluticasone and loratadine.  Allergies  has No Known Allergies.  Social History   reports that he has never smoked. He has never used smokeless tobacco. He reports that he does not drink alcohol and does not use drugs.  Health Screening Exams  Health Maintenance   Topic Date Due    HPV

## 2024-04-25 NOTE — PROGRESS NOTES
Called Irma with Dr Flores office to inform them of note from CANDIS Garcia in regards to heart palpations and dizziness in noted dated 1/4/24.  Irma said she will let Dr Flores know.

## 2024-04-25 NOTE — PROGRESS NOTES
PAT call attempted, patient unavailable, left message to please call us back at your earliest convenience; [phone]

## 2024-04-29 ENCOUNTER — PREP FOR PROCEDURE (OUTPATIENT)
Dept: ENT CLINIC | Age: 19
End: 2024-04-29

## 2024-05-05 PROBLEM — J34.3 HYPERTROPHY OF INFERIOR NASAL TURBINATE: Status: ACTIVE | Noted: 2024-05-05

## 2024-05-05 PROBLEM — J34.89 NASAL OBSTRUCTION: Status: ACTIVE | Noted: 2024-05-05

## 2024-05-05 PROBLEM — R06.5 MOUTH BREATHING: Status: ACTIVE | Noted: 2024-05-05

## 2024-05-05 PROBLEM — J34.89 CONCHA BULLOSA: Status: ACTIVE | Noted: 2024-05-05

## 2024-05-05 PROBLEM — R44.8 FACIAL PRESSURE: Status: ACTIVE | Noted: 2024-05-05

## 2024-05-05 PROBLEM — R06.83 LOUD SNORING: Status: ACTIVE | Noted: 2024-05-05

## 2024-05-05 PROBLEM — J34.89 NASAL SEPTAL SPUR: Status: ACTIVE | Noted: 2024-05-05

## 2024-05-05 PROBLEM — J34.2 NASAL SEPTAL DEVIATION: Status: ACTIVE | Noted: 2024-05-05

## 2024-05-05 PROBLEM — G47.30 SLEEP-DISORDERED BREATHING: Status: ACTIVE | Noted: 2024-05-05

## 2024-05-05 RX ORDER — SODIUM CHLORIDE 9 MG/ML
INJECTION, SOLUTION INTRAVENOUS PRN
Status: CANCELLED | OUTPATIENT
Start: 2024-05-05

## 2024-05-05 RX ORDER — SODIUM CHLORIDE 0.9 % (FLUSH) 0.9 %
5-40 SYRINGE (ML) INJECTION EVERY 12 HOURS SCHEDULED
Status: CANCELLED | OUTPATIENT
Start: 2024-05-05

## 2024-05-05 RX ORDER — SODIUM CHLORIDE 0.9 % (FLUSH) 0.9 %
5-40 SYRINGE (ML) INJECTION PRN
Status: CANCELLED | OUTPATIENT
Start: 2024-05-05

## 2024-05-06 ENCOUNTER — HOSPITAL ENCOUNTER (OUTPATIENT)
Age: 19
Setting detail: OUTPATIENT SURGERY
Discharge: HOME | End: 2024-05-06
Attending: OTOLARYNGOLOGY | Admitting: OTOLARYNGOLOGY
Payer: COMMERCIAL

## 2024-05-06 ENCOUNTER — ANESTHESIA (OUTPATIENT)
Dept: OPERATING ROOM | Age: 19
End: 2024-05-06
Payer: COMMERCIAL

## 2024-05-06 ENCOUNTER — ANESTHESIA EVENT (OUTPATIENT)
Dept: OPERATING ROOM | Age: 19
End: 2024-05-06
Payer: COMMERCIAL

## 2024-05-06 VITALS
HEIGHT: 68 IN | WEIGHT: 166.2 LBS | DIASTOLIC BLOOD PRESSURE: 72 MMHG | HEART RATE: 100 BPM | SYSTOLIC BLOOD PRESSURE: 130 MMHG | RESPIRATION RATE: 18 BRPM | OXYGEN SATURATION: 95 % | BODY MASS INDEX: 25.19 KG/M2 | TEMPERATURE: 98.1 F

## 2024-05-06 DIAGNOSIS — R06.5 MOUTH BREATHING: ICD-10-CM

## 2024-05-06 DIAGNOSIS — G47.30 SLEEP DISORDER BREATHING: ICD-10-CM

## 2024-05-06 DIAGNOSIS — J34.89 NASAL SEPTAL SPUR: ICD-10-CM

## 2024-05-06 DIAGNOSIS — J34.3 HYPERTROPHY OF INFERIOR NASAL TURBINATE: ICD-10-CM

## 2024-05-06 DIAGNOSIS — J34.2 NASAL SEPTAL DEVIATION: Primary | ICD-10-CM

## 2024-05-06 DIAGNOSIS — J34.89 NASAL OBSTRUCTION: ICD-10-CM

## 2024-05-06 DIAGNOSIS — J34.89 CONCHA BULLOSA: ICD-10-CM

## 2024-05-06 DIAGNOSIS — R06.83 LOUD SNORING: ICD-10-CM

## 2024-05-06 PROCEDURE — 6360000002 HC RX W HCPCS: Performed by: OTOLARYNGOLOGY

## 2024-05-06 PROCEDURE — 7100000010 HC PHASE II RECOVERY - FIRST 15 MIN: Performed by: OTOLARYNGOLOGY

## 2024-05-06 PROCEDURE — 6360000002 HC RX W HCPCS: Performed by: NURSE ANESTHETIST, CERTIFIED REGISTERED

## 2024-05-06 PROCEDURE — 3700000001 HC ADD 15 MINUTES (ANESTHESIA): Performed by: OTOLARYNGOLOGY

## 2024-05-06 PROCEDURE — 2500000003 HC RX 250 WO HCPCS: Performed by: OTOLARYNGOLOGY

## 2024-05-06 PROCEDURE — 87205 SMEAR GRAM STAIN: CPT

## 2024-05-06 PROCEDURE — 2709999900 HC NON-CHARGEABLE SUPPLY: Performed by: OTOLARYNGOLOGY

## 2024-05-06 PROCEDURE — 6370000000 HC RX 637 (ALT 250 FOR IP): Performed by: OTOLARYNGOLOGY

## 2024-05-06 PROCEDURE — 31240 NSL/SNS NDSC CNCH BULL RESCJ: CPT | Performed by: OTOLARYNGOLOGY

## 2024-05-06 PROCEDURE — 87147 CULTURE TYPE IMMUNOLOGIC: CPT

## 2024-05-06 PROCEDURE — 7100000011 HC PHASE II RECOVERY - ADDTL 15 MIN: Performed by: OTOLARYNGOLOGY

## 2024-05-06 PROCEDURE — 30520 REPAIR OF NASAL SEPTUM: CPT | Performed by: OTOLARYNGOLOGY

## 2024-05-06 PROCEDURE — 2500000003 HC RX 250 WO HCPCS: Performed by: NURSE ANESTHETIST, CERTIFIED REGISTERED

## 2024-05-06 PROCEDURE — 2720000010 HC SURG SUPPLY STERILE: Performed by: OTOLARYNGOLOGY

## 2024-05-06 PROCEDURE — 3700000000 HC ANESTHESIA ATTENDED CARE: Performed by: OTOLARYNGOLOGY

## 2024-05-06 PROCEDURE — 87070 CULTURE OTHR SPECIMN AEROBIC: CPT

## 2024-05-06 PROCEDURE — 3600000004 HC SURGERY LEVEL 4 BASE: Performed by: OTOLARYNGOLOGY

## 2024-05-06 PROCEDURE — 7100000001 HC PACU RECOVERY - ADDTL 15 MIN: Performed by: OTOLARYNGOLOGY

## 2024-05-06 PROCEDURE — 2580000003 HC RX 258: Performed by: OTOLARYNGOLOGY

## 2024-05-06 PROCEDURE — 3600000014 HC SURGERY LEVEL 4 ADDTL 15MIN: Performed by: OTOLARYNGOLOGY

## 2024-05-06 PROCEDURE — 7100000000 HC PACU RECOVERY - FIRST 15 MIN: Performed by: OTOLARYNGOLOGY

## 2024-05-06 PROCEDURE — 30140 RESECT INFERIOR TURBINATE: CPT | Performed by: OTOLARYNGOLOGY

## 2024-05-06 RX ORDER — SODIUM CHLORIDE 9 MG/ML
INJECTION, SOLUTION INTRAVENOUS PRN
Status: DISCONTINUED | OUTPATIENT
Start: 2024-05-06 | End: 2024-05-06 | Stop reason: HOSPADM

## 2024-05-06 RX ORDER — CEFDINIR 300 MG/1
300 CAPSULE ORAL 2 TIMES DAILY
Qty: 20 CAPSULE | Refills: 0 | Status: SHIPPED | OUTPATIENT
Start: 2024-05-06 | End: 2024-05-16

## 2024-05-06 RX ORDER — ONDANSETRON 2 MG/ML
4 INJECTION INTRAMUSCULAR; INTRAVENOUS
Status: DISCONTINUED | OUTPATIENT
Start: 2024-05-06 | End: 2024-05-06 | Stop reason: HOSPADM

## 2024-05-06 RX ORDER — NALOXONE HYDROCHLORIDE 0.4 MG/ML
INJECTION, SOLUTION INTRAMUSCULAR; INTRAVENOUS; SUBCUTANEOUS PRN
Status: DISCONTINUED | OUTPATIENT
Start: 2024-05-06 | End: 2024-05-06 | Stop reason: HOSPADM

## 2024-05-06 RX ORDER — SODIUM CHLORIDE 0.9 % (FLUSH) 0.9 %
5-40 SYRINGE (ML) INJECTION EVERY 12 HOURS SCHEDULED
Status: DISCONTINUED | OUTPATIENT
Start: 2024-05-06 | End: 2024-05-06 | Stop reason: HOSPADM

## 2024-05-06 RX ORDER — DEXAMETHASONE SODIUM PHOSPHATE 4 MG/ML
INJECTION, SOLUTION INTRA-ARTICULAR; INTRALESIONAL; INTRAMUSCULAR; INTRAVENOUS; SOFT TISSUE PRN
Status: DISCONTINUED | OUTPATIENT
Start: 2024-05-06 | End: 2024-05-06 | Stop reason: ALTCHOICE

## 2024-05-06 RX ORDER — PROPOFOL 10 MG/ML
INJECTION, EMULSION INTRAVENOUS PRN
Status: DISCONTINUED | OUTPATIENT
Start: 2024-05-06 | End: 2024-05-06 | Stop reason: SDUPTHER

## 2024-05-06 RX ORDER — MINERAL OIL, WHITE PETROLATUM .03; .94 G/G; G/G
OINTMENT OPHTHALMIC PRN
Status: DISCONTINUED | OUTPATIENT
Start: 2024-05-06 | End: 2024-05-06 | Stop reason: SDUPTHER

## 2024-05-06 RX ORDER — MIDAZOLAM HYDROCHLORIDE 1 MG/ML
INJECTION INTRAMUSCULAR; INTRAVENOUS PRN
Status: DISCONTINUED | OUTPATIENT
Start: 2024-05-06 | End: 2024-05-06 | Stop reason: SDUPTHER

## 2024-05-06 RX ORDER — HYDROCODONE BITARTRATE AND ACETAMINOPHEN 7.5; 325 MG/1; MG/1
1 TABLET ORAL EVERY 6 HOURS PRN
Qty: 20 TABLET | Refills: 0 | Status: SHIPPED | OUTPATIENT
Start: 2024-05-06 | End: 2024-05-11

## 2024-05-06 RX ORDER — HYDRALAZINE HYDROCHLORIDE 20 MG/ML
10 INJECTION INTRAMUSCULAR; INTRAVENOUS
Status: DISCONTINUED | OUTPATIENT
Start: 2024-05-06 | End: 2024-05-06 | Stop reason: HOSPADM

## 2024-05-06 RX ORDER — GINSENG 100 MG
CAPSULE ORAL PRN
Status: DISCONTINUED | OUTPATIENT
Start: 2024-05-06 | End: 2024-05-06 | Stop reason: ALTCHOICE

## 2024-05-06 RX ORDER — LABETALOL HYDROCHLORIDE 5 MG/ML
10 INJECTION INTRAVENOUS
Status: DISCONTINUED | OUTPATIENT
Start: 2024-05-06 | End: 2024-05-06 | Stop reason: HOSPADM

## 2024-05-06 RX ORDER — ONDANSETRON 2 MG/ML
INJECTION INTRAMUSCULAR; INTRAVENOUS PRN
Status: DISCONTINUED | OUTPATIENT
Start: 2024-05-06 | End: 2024-05-06 | Stop reason: SDUPTHER

## 2024-05-06 RX ORDER — ROCURONIUM BROMIDE 10 MG/ML
INJECTION, SOLUTION INTRAVENOUS PRN
Status: DISCONTINUED | OUTPATIENT
Start: 2024-05-06 | End: 2024-05-06 | Stop reason: SDUPTHER

## 2024-05-06 RX ORDER — FENTANYL CITRATE 50 UG/ML
INJECTION, SOLUTION INTRAMUSCULAR; INTRAVENOUS PRN
Status: DISCONTINUED | OUTPATIENT
Start: 2024-05-06 | End: 2024-05-06 | Stop reason: SDUPTHER

## 2024-05-06 RX ORDER — PREDNISONE 20 MG/1
20 TABLET ORAL DAILY
Qty: 4 TABLET | Refills: 0 | Status: SHIPPED | OUTPATIENT
Start: 2024-05-07 | End: 2024-05-10

## 2024-05-06 RX ORDER — SODIUM CHLORIDE 0.9 % (FLUSH) 0.9 %
5-40 SYRINGE (ML) INJECTION PRN
Status: DISCONTINUED | OUTPATIENT
Start: 2024-05-06 | End: 2024-05-06 | Stop reason: HOSPADM

## 2024-05-06 RX ORDER — DICLOFENAC SODIUM 1 MG/ML
1 SOLUTION/ DROPS OPHTHALMIC 4 TIMES DAILY
Status: DISCONTINUED | OUTPATIENT
Start: 2024-05-06 | End: 2024-05-06 | Stop reason: HOSPADM

## 2024-05-06 RX ORDER — DEXAMETHASONE SODIUM PHOSPHATE 4 MG/ML
10 INJECTION, SOLUTION INTRA-ARTICULAR; INTRALESIONAL; INTRAMUSCULAR; INTRAVENOUS; SOFT TISSUE
Status: COMPLETED | OUTPATIENT
Start: 2024-05-06 | End: 2024-05-06

## 2024-05-06 RX ORDER — LIDOCAINE HCL/PF 100 MG/5ML
SYRINGE (ML) INJECTION PRN
Status: DISCONTINUED | OUTPATIENT
Start: 2024-05-06 | End: 2024-05-06 | Stop reason: SDUPTHER

## 2024-05-06 RX ORDER — HYDROCODONE BITARTRATE AND ACETAMINOPHEN 7.5; 325 MG/1; MG/1
1 TABLET ORAL ONCE
Status: COMPLETED | OUTPATIENT
Start: 2024-05-06 | End: 2024-05-06

## 2024-05-06 RX ORDER — LIDOCAINE HYDROCHLORIDE AND EPINEPHRINE 10; 10 MG/ML; UG/ML
INJECTION, SOLUTION INFILTRATION; PERINEURAL PRN
Status: DISCONTINUED | OUTPATIENT
Start: 2024-05-06 | End: 2024-05-06 | Stop reason: ALTCHOICE

## 2024-05-06 RX ORDER — OXYMETAZOLINE HYDROCHLORIDE 0.05 G/100ML
SPRAY NASAL PRN
Status: DISCONTINUED | OUTPATIENT
Start: 2024-05-06 | End: 2024-05-06 | Stop reason: ALTCHOICE

## 2024-05-06 RX ADMIN — HYDROCODONE BITARTRATE AND ACETAMINOPHEN 1 TABLET: 7.5; 325 TABLET ORAL at 12:19

## 2024-05-06 RX ADMIN — MINERAL OIL, WHITE PETROLATUM 1 ML: .03; .94 OINTMENT OPHTHALMIC at 07:45

## 2024-05-06 RX ADMIN — CEFTRIAXONE SODIUM 1000 MG: 1 INJECTION, POWDER, FOR SOLUTION INTRAMUSCULAR; INTRAVENOUS at 07:06

## 2024-05-06 RX ADMIN — FENTANYL CITRATE 50 MCG: 50 INJECTION, SOLUTION INTRAMUSCULAR; INTRAVENOUS at 07:36

## 2024-05-06 RX ADMIN — ONDANSETRON 4 MG: 2 INJECTION INTRAMUSCULAR; INTRAVENOUS at 10:18

## 2024-05-06 RX ADMIN — SODIUM CHLORIDE: 9 INJECTION, SOLUTION INTRAVENOUS at 07:02

## 2024-05-06 RX ADMIN — MIDAZOLAM 2 MG: 1 INJECTION INTRAMUSCULAR; INTRAVENOUS at 07:30

## 2024-05-06 RX ADMIN — Medication 100 MG: at 07:44

## 2024-05-06 RX ADMIN — ROCURONIUM BROMIDE 40 MG: 10 INJECTION INTRAVENOUS at 07:44

## 2024-05-06 RX ADMIN — ROCURONIUM BROMIDE 10 MG: 10 INJECTION INTRAVENOUS at 08:05

## 2024-05-06 RX ADMIN — ROCURONIUM BROMIDE 10 MG: 10 INJECTION INTRAVENOUS at 09:57

## 2024-05-06 RX ADMIN — ROCURONIUM BROMIDE 20 MG: 10 INJECTION INTRAVENOUS at 08:32

## 2024-05-06 RX ADMIN — FENTANYL CITRATE 50 MCG: 50 INJECTION, SOLUTION INTRAMUSCULAR; INTRAVENOUS at 08:34

## 2024-05-06 RX ADMIN — SUGAMMADEX 200 MG: 100 INJECTION, SOLUTION INTRAVENOUS at 10:55

## 2024-05-06 RX ADMIN — FENTANYL CITRATE 50 MCG: 50 INJECTION, SOLUTION INTRAMUSCULAR; INTRAVENOUS at 11:13

## 2024-05-06 RX ADMIN — DEXAMETHASONE SODIUM PHOSPHATE 10 MG: 4 INJECTION, SOLUTION INTRAMUSCULAR; INTRAVENOUS at 07:02

## 2024-05-06 RX ADMIN — FENTANYL CITRATE 50 MCG: 50 INJECTION, SOLUTION INTRAMUSCULAR; INTRAVENOUS at 08:06

## 2024-05-06 RX ADMIN — PROPOFOL 160 MG: 10 INJECTION, EMULSION INTRAVENOUS at 07:44

## 2024-05-06 RX ADMIN — ROCURONIUM BROMIDE 10 MG: 10 INJECTION INTRAVENOUS at 09:18

## 2024-05-06 ASSESSMENT — PAIN DESCRIPTION - DESCRIPTORS
DESCRIPTORS: ACHING
DESCRIPTORS: SORE
DESCRIPTORS: ACHING

## 2024-05-06 ASSESSMENT — PAIN DESCRIPTION - LOCATION
LOCATION: NOSE
LOCATION: NOSE

## 2024-05-06 ASSESSMENT — PAIN - FUNCTIONAL ASSESSMENT
PAIN_FUNCTIONAL_ASSESSMENT: 0-10
PAIN_FUNCTIONAL_ASSESSMENT: NONE - DENIES PAIN

## 2024-05-06 ASSESSMENT — PAIN DESCRIPTION - ORIENTATION
ORIENTATION: INNER
ORIENTATION: INNER

## 2024-05-06 ASSESSMENT — PAIN DESCRIPTION - PAIN TYPE
TYPE: ACUTE PAIN;SURGICAL PAIN
TYPE: ACUTE PAIN;SURGICAL PAIN

## 2024-05-06 ASSESSMENT — PAIN DESCRIPTION - FREQUENCY: FREQUENCY: CONTINUOUS

## 2024-05-06 ASSESSMENT — PAIN SCALES - GENERAL
PAINLEVEL_OUTOF10: 6
PAINLEVEL_OUTOF10: 3

## 2024-05-06 NOTE — PROGRESS NOTES
In to see patient for right eye irritation. Patient states it feels as if he has an eyelash in his eye. Right eye appears red and irritated. Instructed patient to try and not rub the eye and given saline flushes to help alleviate FB. Discussed with Dr. Flores.

## 2024-05-06 NOTE — ANESTHESIA PRE PROCEDURE
02/01/2024 03:08 AM    CREATININE 1.2 02/01/2024 03:08 AM    LABGLOM >60 02/01/2024 03:08 AM    GLUCOSE 95 02/01/2024 03:08 AM    CALCIUM 9.6 02/01/2024 03:08 AM    BILITOT 0.5 02/01/2024 03:08 AM    ALKPHOS 90 02/01/2024 03:08 AM    AST 25 02/01/2024 03:08 AM    ALT 16 02/01/2024 03:08 AM       POC Tests: No results for input(s): \"POCGLU\", \"POCNA\", \"POCK\", \"POCCL\", \"POCBUN\", \"POCHEMO\", \"POCHCT\" in the last 72 hours.    Coags: No results found for: \"PROTIME\", \"INR\", \"APTT\"    HCG (If Applicable): No results found for: \"PREGTESTUR\", \"PREGSERUM\", \"HCG\", \"HCGQUANT\"     ABGs: No results found for: \"PHART\", \"PO2ART\", \"BQC2TMV\", \"MWR4JML\", \"BEART\", \"V1WFBPIN\"     Type & Screen (If Applicable):  No results found for: \"LABABO\"    Drug/Infectious Status (If Applicable):  No results found for: \"HIV\", \"HEPCAB\"    COVID-19 Screening (If Applicable):   Lab Results   Component Value Date/Time    COVID19 Detected 12/21/2020 08:59 PM           Anesthesia Evaluation     no history of anesthetic complications:   Airway: Mallampati: I  TM distance: >3 FB   Neck ROM: full  Mouth opening: > = 3 FB   Dental:          Pulmonary:normal exam              Patient did not smoke on day of surgery.                 Cardiovascular:  Exercise tolerance: good (>4 METS)                    Neuro/Psych:   Negative Neuro/Psych ROS              GI/Hepatic/Renal: Neg GI/Hepatic/Renal ROS            Endo/Other: Negative Endo/Other ROS             Pt had no PAT visit       Abdominal:             Vascular: negative vascular ROS.         Other Findings:       Anesthesia Plan      general     ASA 1       Induction: intravenous.    MIPS: Postoperative opioids intended and Prophylactic antiemetics administered.  Anesthetic plan and risks discussed with patient.      Plan discussed with CRNA.                Rolando Chicas MD   5/6/2024

## 2024-05-06 NOTE — PROGRESS NOTES
This RN assisted patient to stand pt reports dizziness. Pt assisted back in bed. Pt encouraged to drink more fluids. Pt requesting to rest longer.

## 2024-05-06 NOTE — PROGRESS NOTES
Pt admitted to [facility] room 10 and oriented to unit. SCD sleeves applied. Pt verbalized permission for first name, last initial and physicians name on white board. SDS board and discharge criteria explained, pt and family verbalized understanding. Pt denies thoughts of harming self or others. Call light in reach. Family at the bedside.  Monica [phone]

## 2024-05-06 NOTE — H&P
[facility] [place] [facility]  [facility] EAR, NOSE AND THROAT  [address]  [address]  [place] [id number]  Dept: [phone]  Dept Fax: [phone]  Loc: [phone]    Ender Jacobo is a 18 y.o. male who was referred by No ref. provider found for:  Preoperative history and physical for upcoming nasal surgery.  His nasal obstruction congestion and rhinogenic headaches explained by findings on the CT scan and physical exam.    HPI:     Prior visit documentation 02/21/2024:  Ender Jacobo presents today for CT review.  He is accompanied by mother who assist with obtaining HPI today.  Patient states that he has utilized the prescribed regimen of nasal steroid spray and states that it has not resolved any of his nasal obstructive symptoms and he is still dealing with significant difficulty breathing through his nose.  Patient describes that he also has history of significant facial pressure and pain surrounding bilateral eyes that is worse in the morning and with weather changes. He does report history of tonsil stones with associated halitosis and mother describes prolonged history of recurrent strep throat approximately once per year since childhood.  Patient denies any concern with difficulty swallowing.  No personal or family history of bleeding disorders or complications with anesthesia.  Patient without personal history of cardiac or lung disorders.  Patient states that he has extremely ready to improve his nasal airway to improve his quality of sleep and impact on his daily life.  Mother reports that after CT review she has a greater understanding of all of patient's symptoms and describes that everything is starting to make sense. Patient does describe history of dental problems with right wisdom tooth getting extraction in the future. Reviewed this in conjunction with maxillary sinus cyst findings on imaging. No other questions or concerns noted at this time.     Previous visit  RESCJ INFERIOR TURBINATE PRTL/COMPL     VT NASAL/SINUS NDSC SURG W/SELVIN BULLOSA RESECTION    left      4. Nasal septal spur  J34.89 SEPTOPLASTY     VT SUBMUCOUS RESCJ INFERIOR TURBINATE PRTL/COMPL     VT NASAL/SINUS NDSC SURG W/SELVIN BULLOSA RESECTION      5. 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 Sleep-disordered breathing  G47.30 SEPTOPLASTY     VT SUBMUCOUS RESCJ INFERIOR TURBINATE PRTL/COMPL     VT NASAL/SINUS NDSC SURG W/SELVIN BULLOSA RESECTION      7. Mouth breathing  R06.5 SEPTOPLASTY     VT SUBMUCOUS RESCJ INFERIOR TURBINATE PRTL/COMPL     VT NASAL/SINUS NDSC SURG W/SELVIN BULLOSA RESECTION      8. Loud snoring  R06.83 SEPTOPLASTY     VT SUBMUCOUS RESCJ INFERIOR TURBINATE PRTL/COMPL     VT NASAL/SINUS NDSC SURG W/SELVIN BULLOSA RESECTION      9. Facial pressure  R44.8 SEPTOPLASTY     VT SUBMUCOUS RESCJ INFERIOR TURBINATE PRTL/COMPL     VT NASAL/SINUS NDSC SURG W/SELVIN BULLOSA RESECTION           Reviewed CT imaging in detail with patient and mother today with recommendations to include: Septoplasty, partial submucosal resection of left inferior nasal turbinate, partial resection of selvin bullosa to bilateral middle nasal turbinates, partial resection of selvin bullosa to bilateral superior nasal turbinates.  Reviewed patient's history of tonsil concerns including recurrent strep throat and tonsil stones.  Patient wishes to proceed with nasal surgery and monitor for further throat concerns after recovery with consideration for tonsillectomy if symptoms persist in the future.  Patient and mother agreeable to proceeding with recommended surgical plan with Dr. Flores.  Patient met with surgery scheduler and all questions answered-patient to return to care for scheduled surgery and understands to contact office in the interval with any questions or concerns.    Septoplasty and turbinate surgery

## 2024-05-06 NOTE — BRIEF OP NOTE
Brief Postoperative Note      Patient: Ender Jacobo  YOB: 2005  MRN: [id number]    Date of Procedure: 5/6/2024    Pre-Op Diagnosis Codes:     * Nasal obstruction [J34.89]     * Nasal septal deviation [J34.2]     * Hypertrophy of inferior nasal turbinate [J34.3]     * Nasal septal spur [J34.89]     * Michael bullosa [J34.89]     * Sleep disorder breathing [G47.30]     * Mouth breathing [R06.5]     * Loud snoring [R06.83]    Post-Op Diagnosis: Same and septal perforation       Procedure(s):  Septoplasty, Partial Submucosal Resection of Left Inferior Nasal Turbinate, partial resection of Left Middle and Left Superior Nasal Turbinate michael bullosae    Surgeon(s):  Padmini Arreola MD    Assistant:  * No surgical staff found *    Anesthesia: General    Estimated Blood Loss (mL): less than 50     Complications: Other: Septal perforation high and superior between the michael bullosae of the superior turbinates.  Right superior turbinate approximated to the defect to hopefully close it.    Specimens:   ID Type Source Tests Collected by Time Destination   A : Bilateral Nasal Fossa Swab Nose SURGICAL PATHOLOGY, CULTURE, AEROBIC Padmini Arreola MD 5/6/2024 0816        Implants:  * No implants in log *      Drains: * No LDAs found *    Findings: Convex right septal deviation with old fractures anteriorly and subluxation off the maxillary crest to the right.  With a huge soft tissue hypertrophy of the left inferior turbinate and bilateral michael bullosae of the superior turbinates and left middle.  During the surgical treatment of the left superior turbinate there was a weak area in the nasal septum between the conchal bullosae perforation developed.  This contralateral superior turbinate was positioned up against this to close it.  Perforation is so far posterior that it is unlikely to cause any difficulties anyway.  Remainder of the procedures were routine.    Electronically signed by PADMINI ARREOLA MD on

## 2024-05-06 NOTE — PROGRESS NOTES
Pt has met discharge criteria and states he is ready for discharge to home. IV removed, gauze and tape applied. Dressed in own clothes and personal belongings gathered. Discharge instructions (with opioid medication education information) given to pt and family. Pt and family verbalized understanding of discharge instructions, prescriptions and follow up appointments. Eye drops prescribed per Dr. Hamilton for home use. 1 drop 4 times daily to the right eye. Pt and family notified per provider. This RN gave drops to patient mom. Pt transported to discharge lobby by [facility] staff.

## 2024-05-06 NOTE — ANESTHESIA POSTPROCEDURE EVALUATION
Department of Anesthesiology  Postprocedure Note    Patient: Ender Jacobo  MRN: [id number]  YOB: 2005  Date of evaluation: 5/6/2024    Procedure Summary       Date: 05/06/24 Room / Location: [facility] OR  / [facility] OR    Anesthesia Start: 0730 Anesthesia Stop: 1113    Procedure: Septoplasty, Partial Submucosal Resection of Left Inferior Nasal Turbinate, Resection of Left Middle Nasal Turbinate and Left Superior Nasal Turbinate (Bilateral: Nose) Diagnosis:       Nasal obstruction      Nasal septal deviation      Hypertrophy of inferior nasal turbinate      Nasal septal spur      Catherine bullosa      Sleep disorder breathing      Mouth breathing      Loud snoring      (Nasal obstruction [J34.89])      (Nasal septal deviation [J34.2])      (Hypertrophy of inferior nasal turbinate [J34.3])      (Nasal septal spur [J34.89])      (Catherine bullosa [J34.89])      (Sleep disorder breathing [G47.30])      (Mouth breathing [R06.5])      (Loud snoring [R06.83])    Surgeons: Hero Flores MD Responsible Provider: Rolando Chicas MD    Anesthesia Type: general ASA Status: 1            Anesthesia Type: No value filed.    Adina Phase I: Adina Score: 10    Adina Phase II:      Anesthesia Post Evaluation    Patient location during evaluation: PACU  Patient participation: complete - patient participated  Level of consciousness: awake and alert  Airway patency: patent  Nausea & Vomiting: no nausea  Cardiovascular status: blood pressure returned to baseline and hemodynamically stable  Respiratory status: acceptable and spontaneous ventilation  Hydration status: euvolemic  Pain management: adequate    No notable events documented.

## 2024-05-06 NOTE — INTERVAL H&P NOTE
Pt Name: Ender Jacobo  MRN: [id number]  YOB: 2005  Date of evaluation: 5/6/2024    I have examined the patient and reviewed the H&P/Consult and there are no changes to the patient or plans.         Electronically signed by PADMINI ARREOLA MD on 5/6/2024 at 7:24 AM

## 2024-05-06 NOTE — DISCHARGE INSTRUCTIONS
NASAL SURGERY   POST-OP INSTRUCTION SHEET    1. Keep your scheduled post-operative appointment.  2. Do not blow your nose for 3 days after surgery. You may gently sniff   to clear drainage. No snorting.  3. Expect moderate amounts of bloody discharge for a few hours. Change your dressing   as needed if it becomes soiled. Place it on your upper lip and do not block the nostrils. If you are not having discharge you do not have to wear a dressing.   4. Activity should be gradually increased, but you should not lift over   10-15 lbs., or exercise more strenuously than fast walking for 3 days  following surgery. Straining to stabilize your core is the issue, not how much your arm is holding. You may return to work, with these guidelines in mind, as soon as you feel well enough, and are off narcotic pain medicine, unless work is in a diego/dirty environment.      Please follow additional post-op instructions provided by nursing staff upon leaving hospital.  If you have any questions or problems, please contact our office ar [phone]       What symptoms are normal?    Soreness in front of nose and/or upper teeth  Clear/bloody drainage during first 3 days is not unusual  Facial pains/headaches  Sutures inside of nose will either dissolve away or fall off-- May take up to 6 wks after surgery  Nasal stuffiness improves gradually over weeks.    Medicines:    You will get 3 prescriptions sent directly to your pharmacy     Use Afrin nasal spray, 8 drops each nostril on back with head hanging off edge of bed, stay five minutes, at least every 8 hours and at bedtime for five days.. Today at 4 PM, 10 PM, 6 AM, 12 NOON      Brand-name original AFRIN is more comfortable.  You may use that instead of the generic oxymetazoline from the hospital.    Apply Bacitracin ointment with a Q tip, inside about a half inch all around.   Three times a day for 2 weeks..then every night as needed for 4 weeks    Saline Sinus Rinse:   Twice a Day

## 2024-05-06 NOTE — PROGRESS NOTES
1110  - pt arrives to pacu, respirations unlabored on room air, pt denies pain, VSS    1115 - pt given 50 mcg of fentanyl by CRNA    1130 - pt denies pain    1140 - pt meets criteria for discharge from pacu at this time, pt transported to [facility] in stable condition

## 2024-05-06 NOTE — PROGRESS NOTES
Patient continues to have c/o discomfort from right eye. States improved but still feels \"scratchy\". Dr Hamilton notified states he will update RN.

## 2024-05-06 NOTE — PROGRESS NOTES
Dr. Chicas anesthesia paged per Jordyn GLOVER to update regarding patient having c/o right eye irritation.

## 2024-05-07 ENCOUNTER — OFFICE VISIT (OUTPATIENT)
Dept: ENT CLINIC | Age: 19
End: 2024-05-07

## 2024-05-07 VITALS
TEMPERATURE: 97.3 F | RESPIRATION RATE: 18 BRPM | HEART RATE: 88 BPM | DIASTOLIC BLOOD PRESSURE: 70 MMHG | SYSTOLIC BLOOD PRESSURE: 110 MMHG | OXYGEN SATURATION: 98 % | BODY MASS INDEX: 24.43 KG/M2 | HEIGHT: 68 IN | WEIGHT: 161.2 LBS

## 2024-05-07 DIAGNOSIS — J34.89 NASAL SEPTAL SPUR: ICD-10-CM

## 2024-05-07 DIAGNOSIS — R55 VASOVAGAL REACTION: ICD-10-CM

## 2024-05-07 DIAGNOSIS — J34.89 CONCHA BULLOSA: ICD-10-CM

## 2024-05-07 DIAGNOSIS — G47.30 SLEEP-DISORDERED BREATHING: ICD-10-CM

## 2024-05-07 DIAGNOSIS — J34.3 HYPERTROPHY OF INFERIOR NASAL TURBINATE: ICD-10-CM

## 2024-05-07 DIAGNOSIS — R06.5 MOUTH BREATHING: ICD-10-CM

## 2024-05-07 DIAGNOSIS — J34.2 NASAL SEPTAL DEVIATION: Primary | ICD-10-CM

## 2024-05-07 PROCEDURE — 99024 POSTOP FOLLOW-UP VISIT: CPT | Performed by: OTOLARYNGOLOGY

## 2024-05-07 RX ORDER — GLYCOPYRROLATE 1 MG/1
1 TABLET ORAL ONCE AS NEEDED
Qty: 5 TABLET | Refills: 0 | Status: SHIPPED | OUTPATIENT
Start: 2024-05-07

## 2024-05-07 NOTE — PROGRESS NOTES
[facility] [place] [facility]  [facility] EAR, NOSE AND THROAT  [address]  [address]  [facility] [address]  Dept: [phone]  Dept Fax: [phone]  Loc: [phone]    Ender Jacobo is a 18 y.o. male who was referred by No ref. provider found for:  Chief Complaint   Patient presents with    Post-Op Check     Patient here for post op exam. Patient states that he is doing ok since surgery just tired.   .    HPI:     Ender Jacobo is a 18 y.o. male who presents today for ***.    History:     No Known Allergies  Current Outpatient Medications   Medication Sig Dispense Refill    predniSONE (DELTASONE) 20 MG tablet Take 1 tablet by mouth daily for 3 days Then one-half tablet for two days. Start the day AFTER surgery 4 tablet 0    cefdinir (OMNICEF) 300 MG capsule Take 1 capsule by mouth 2 times daily for 10 days Start the day after surgery, Tuesday, 5/7/2024 20 capsule 0    HYDROcodone-acetaminophen (NORCO) 7.5-325 MG per tablet Take 1 tablet by mouth every 6 hours as needed for Pain for up to 5 days. Max Daily Amount: 4 tablets (Patient not taking: Reported on 5/7/2024) 20 tablet 0     No current facility-administered medications for this visit.     History reviewed. No pertinent past medical history.   Past Surgical History:   Procedure Laterality Date    LEG BIOPSY EXCISION Bilateral 2/15/2024    Excision Lipomas Right Thigh, Left thigh, Right Upperarm, Left Upperarm, Chest, Left Lower Quadrant, Right Back performed by Mendoza Crabtree DO at [facility] [place] [facility]    LIPOMA RESECTION  06/18/2021    excision of right bicep and right forearm lipomas in office-Dr. Crabtree    UPPER GASTROINTESTINAL ENDOSCOPY  10/2020     Family History   Problem Relation Age of Onset    Diabetes Mother         Insulin resistance    High Cholesterol Mother     Obesity Mother     High Cholesterol Father     Diabetes Maternal Grandmother     Heart Disease Maternal Grandmother     High Blood Pressure Maternal

## 2024-05-08 LAB
BACTERIA SPEC AEROBE CULT: NORMAL
GRAM STN SPEC: NORMAL

## 2024-05-08 NOTE — OP NOTE
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  Right superior turbinate approximated to the defect to hopefully close it.     Specimens:   ID Type Source Tests Collected by Time Destination   A : Bilateral Nasal Fossa Swab Nose SURGICAL PATHOLOGY, CULTURE, AEROBIC Hero Flores MD 5/6/2024 0816           Implants:  * No implants in log *      Drains: * No LDAs found *     Findings: Convex right septal deviation with old fractures anteriorly and subluxation off the maxillary crest to the right.  With a huge soft tissue hypertrophy of the left inferior turbinate and bilateral michael bullosae of the superior turbinates and left middle.  During the surgical treatment of the left superior turbinate there was a weak area in the nasal septum between the conchal bullosae perforation developed.  This contralateral superior turbinate was positioned up against this to close it.  Perforation is so far posterior that it is unlikely to cause any difficulties anyway.  Remainder of the procedures were routine.    Detailed Description of Procedure:   After an adequate level of general  hemitransfixion incision.  Sutures were very carefully placed in order to approximate the delicate membranes.  There were no opposing fenestrations, and the septum was midline.    Neurovascular blocks were repeated with 0.5% ropivacaine with epinephrine 1-100,000.  Surgiflo was instilled as needed some of the raw areas.  2 cc of lidocaine with epinephrine were injected through the greater palatine foramina on each side into the pterygopalatine fossa for postoperative pain relief and vasoconstriction.  The stomach was suctioned.    Attention was returned to the nasal fossa.  Nasopharynx was suctioned of clot.  Hemostasis was assessed.  Bacitracin ointment was applied to both sides of the nasal fossa.  The patient was then awakened, extubated and taken to the recovery room in stable condition.  There were no complications and the patient tolerated the procedure well.      Electronically signed by PADMINI ARREOLA MD on 5/7/2024 at 11:45 PM

## 2024-05-14 ENCOUNTER — OFFICE VISIT (OUTPATIENT)
Dept: ENT CLINIC | Age: 19
End: 2024-05-14

## 2024-05-14 VITALS
TEMPERATURE: 98.5 F | BODY MASS INDEX: 24.81 KG/M2 | WEIGHT: 163.7 LBS | HEART RATE: 98 BPM | HEIGHT: 68 IN | DIASTOLIC BLOOD PRESSURE: 74 MMHG | OXYGEN SATURATION: 100 % | SYSTOLIC BLOOD PRESSURE: 130 MMHG

## 2024-05-14 DIAGNOSIS — J34.2 NASAL SEPTAL DEVIATION: Primary | ICD-10-CM

## 2024-05-14 DIAGNOSIS — J34.89 NASAL SEPTAL SPUR: ICD-10-CM

## 2024-05-14 DIAGNOSIS — R44.8 FACIAL PRESSURE: ICD-10-CM

## 2024-05-14 DIAGNOSIS — J34.89 CONCHA BULLOSA: ICD-10-CM

## 2024-05-14 PROCEDURE — 99024 POSTOP FOLLOW-UP VISIT: CPT | Performed by: OTOLARYNGOLOGY

## 2024-05-15 NOTE — PROGRESS NOTES
[facility] [place] [facility]  [facility] EAR, NOSE AND THROAT  [address]  [address]  [facility] [address]  Dept: [phone]  Dept Fax: [phone]  Loc: [phone]    Ender Jacobo is a 19 y.o. male who was referred byNo ref. provider found for:  Chief Complaint   Patient presents with    Post-Op Check     Patient here for post op exam. Patient states that he is doing ok since surgery just tired.   .    HPI:     Ender Jacobo is a 19 y.o. male who presents today for follow-up on surgery yesterday. The patient has some nasal congestion.  The pain medication is adequate.  They were using their Afrin.  He is breathing better    History:     No Known Allergies  Current Outpatient Medications   Medication Sig Dispense Refill    glycopyrrolate (ROBINUL) 1 MG tablet Take 1 tablet by mouth 1 (one) time if needed (30 min prior to office visit) 5 tablet 0    cefdinir (OMNICEF) 300 MG capsule Take 1 capsule by mouth 2 times daily for 10 days Start the day after surgery, Tuesday, 5/7/2024 20 capsule 0     No current facility-administered medications for this visit.     History reviewed. No pertinent past medical history.     Objective:   /70 (Site: Right Upper Arm, Position: Sitting)   Pulse 88   Temp 97.3 °F (36.3 °C) (Infrared)   Resp 18   Ht 1.727 m (5' 8\")   Wt 73.1 kg (161 lb 3.2 oz)   SpO2 98%   BMI 24.51 kg/m²     Nose: Nose is decongested and anesthetized with topical sprays.  Nasal fossa is suctioned bilaterally.    Clots are removed.  Normal postoperative appearance.     Data:  All of the past medical history, past surgical history, family history,social history, allergies   and current medications were reviewed with the patient.  Assessment & Plan   Assessment & Plan   Diagnoses and orders for this visit:     Diagnosis Orders   1. Nasal septal deviation        2. Hypertrophy of inferior nasal turbinate        3. Nasal septal spur        4. Catherine bullosa        5.

## 2024-05-16 ENCOUNTER — PATIENT MESSAGE (OUTPATIENT)
Dept: FAMILY MEDICINE CLINIC | Age: 19
End: 2024-05-16

## 2024-05-16 DIAGNOSIS — L71.9 ROSACEA: Primary | ICD-10-CM

## 2024-05-16 RX ORDER — METRONIDAZOLE 7.5 MG/G
GEL TOPICAL
Qty: 45 G | Refills: 0 | Status: SHIPPED | OUTPATIENT
Start: 2024-05-16

## 2024-05-16 NOTE — TELEPHONE ENCOUNTER
From: Endre Jacobo  To: Ivelisse Dang  Sent: 5/16/2024 10:37 AM EDT  Subject: Rash    Hey, about 2 months ago I started receiving a small rash on the right side of my face. Recently it’s kortney went to the same spot on the opposite side of my face and now under my nose. Its burns and itch majority of the time, sometimes I get it under control and I can’t see it but when it breaks out it’s not a fun time..A photo in [employer] is the best I can do at this moment. Just dry, irritated, burning and itchy. Thanks!

## 2024-05-22 NOTE — PROGRESS NOTES
[facility] [place] [facility]  [facility] EAR, NOSE AND THROAT  [address]  [address]  [facility] [address]  Dept: [phone]  Dept Fax: [phone]  Loc: [phone]    Ender Jacobo is a 19 y.o. male who was referred byNo ref. provider found for:  Chief Complaint   Patient presents with    Post-Op Check     5/6 septo, smr, michael (90 day global). Pt states he still can't taste or small.   .    HPI:     Ender Jacobo is a 19 y.o. male who presents today for follow-up on nasal surgery a week ago. The patient has some nasal congestion.  The pain medication is adequate.  They stopped using their Afrin and they are performing the buffered saline nasal irrigations. Nasal airway is improved.  Facial pressure is improved.    History:     No Known Allergies  Current Outpatient Medications   Medication Sig Dispense Refill    glycopyrrolate (ROBINUL) 1 MG tablet Take 1 tablet by mouth 1 (one) time if needed (30 min prior to office visit) 5 tablet 0    metroNIDAZOLE (METROGEL) 0.75 % gel Apply topically 2 times daily. 45 g 0     No current facility-administered medications for this visit.     No past medical history on file.     Objective:   /74 (Site: Right Upper Arm, Position: Sitting, Cuff Size: Medium Adult)   Pulse 98   Temp 98.5 °F (36.9 °C) (Temporal)   Ht 1.727 m (5' 8\")   Wt 74.3 kg (163 lb 11.2 oz)   SpO2 100%   BMI 24.89 kg/m²     Nose: Nose is decongested and anesthetized with topical sprays.  Nasal fossa has some crusting, most of which is removed with suction and instrumentation.  Normal postoperative appearance.    Data:  All of the past medical history, past surgical history, family history,social history, allergies   and current medications were reviewed with the patient.  Assessment & Plan   Assessment & Plan   Diagnoses and orders for this visit:     Diagnosis Orders   1. Nasal septal deviation        2. Nasal septal spur        3. Michael bullosa        4. Facial 
Paternal Grandmother         uterine    Cancer Paternal Grandfather 63        Stomach     Social History     Tobacco Use    Smoking status: Never    Smokeless tobacco: Never   Substance Use Topics    Alcohol use: No       Subjective:      Review of Systems   Constitutional:  Negative for activity change, appetite change, chills, diaphoresis, fatigue, fever and unexpected weight change.   HENT:  Negative for congestion, dental problem, ear discharge, ear pain, facial swelling, hearing loss, mouth sores, nosebleeds, postnasal drip, rhinorrhea, sinus pressure, sneezing, sore throat, tinnitus, trouble swallowing and voice change.    Eyes:  Negative for visual disturbance.   Respiratory:  Negative for apnea, cough, choking, chest tightness, shortness of breath, wheezing and stridor.    Cardiovascular:  Negative for chest pain, palpitations and leg swelling.   Gastrointestinal:  Negative for abdominal pain, diarrhea, nausea and vomiting.   Endocrine: Negative for cold intolerance, heat intolerance, polydipsia and polyuria.   Genitourinary:  Negative for dysuria, enuresis and hematuria.   Musculoskeletal:  Negative for arthralgias, gait problem, neck pain and neck stiffness.   Skin:  Negative for color change and rash.   Allergic/Immunologic: Negative for environmental allergies, food allergies and immunocompromised state.   Neurological:  Negative for dizziness, syncope, facial asymmetry, speech difficulty, light-headedness and headaches.   Hematological:  Negative for adenopathy. Does not bruise/bleed easily.   Psychiatric/Behavioral:  Negative for confusion and sleep disturbance. The patient is not nervous/anxious.        Objective:   /74 (Site: Right Upper Arm, Position: Sitting, Cuff Size: Medium Adult)   Pulse 98   Temp 98.5 °F (36.9 °C) (Temporal)   Ht 1.727 m (5' 8\")   Wt 74.3 kg (163 lb 11.2 oz)   SpO2 100%   BMI 24.89 kg/m²     Physical Exam    {[id number]}    Data:  All of the past medical history,

## 2024-06-04 ENCOUNTER — OFFICE VISIT (OUTPATIENT)
Dept: ENT CLINIC | Age: 19
End: 2024-06-04

## 2024-06-04 VITALS
TEMPERATURE: 97.3 F | RESPIRATION RATE: 18 BRPM | DIASTOLIC BLOOD PRESSURE: 78 MMHG | WEIGHT: 166 LBS | SYSTOLIC BLOOD PRESSURE: 120 MMHG | HEART RATE: 89 BPM | OXYGEN SATURATION: 98 % | HEIGHT: 68 IN | BODY MASS INDEX: 25.16 KG/M2

## 2024-06-04 DIAGNOSIS — J34.89 NASAL OBSTRUCTION: ICD-10-CM

## 2024-06-04 DIAGNOSIS — J34.89 CONCHA BULLOSA: ICD-10-CM

## 2024-06-04 DIAGNOSIS — R06.5 MOUTH BREATHING: ICD-10-CM

## 2024-06-04 DIAGNOSIS — R44.8 FACIAL PRESSURE: ICD-10-CM

## 2024-06-04 DIAGNOSIS — J34.89 NASAL SEPTAL SPUR: ICD-10-CM

## 2024-06-04 DIAGNOSIS — J34.2 NASAL SEPTAL DEVIATION: Primary | ICD-10-CM

## 2024-06-04 DIAGNOSIS — J34.3 HYPERTROPHY OF INFERIOR NASAL TURBINATE: ICD-10-CM

## 2024-06-04 PROCEDURE — 99024 POSTOP FOLLOW-UP VISIT: CPT | Performed by: OTOLARYNGOLOGY

## 2024-06-04 NOTE — PROGRESS NOTES
[facility] [place] [facility]  [facility] EAR, NOSE AND THROAT  [address]  [address]  [facility] [address]  Dept: [phone]  Dept Fax: [phone]  Loc: [phone]    Ender Jacobo is a 19 y.o. male who was referred by No ref. provider found for:  Chief Complaint   Patient presents with    Post-Op Check     Patient here for post op exam. Patient states that he is doing good since surgery and breathing better.   .    HPI:     Ender Jacobo is a 19 y.o. male who presents today for ***.    History:     No Known Allergies  Current Outpatient Medications   Medication Sig Dispense Refill    metroNIDAZOLE (METROGEL) 0.75 % gel Apply topically 2 times daily. 45 g 0    glycopyrrolate (ROBINUL) 1 MG tablet Take 1 tablet by mouth 1 (one) time if needed (30 min prior to office visit) 5 tablet 0     No current facility-administered medications for this visit.     History reviewed. No pertinent past medical history.   Past Surgical History:   Procedure Laterality Date    LEG BIOPSY EXCISION Bilateral 2/15/2024    Excision Lipomas Right Thigh, Left thigh, Right Upperarm, Left Upperarm, Chest, Left Lower Quadrant, Right Back performed by Mendoza Crabtree DO at [facility]    LIPOMA RESECTION  06/18/2021    excision of right bicep and right forearm lipomas in office-Dr. Crabtree    NASAL SURG PROC UNLISTED Bilateral 5/6/2024    Septoplasty, Partial Submucosal Resection of Left Inferior Nasal Turbinate, Resection of Left Middle Nasal Turbinate and Left Superior Nasal Turbinate performed by Hero Flores MD at [facility] OR    UPPER GASTROINTESTINAL ENDOSCOPY  10/2020     Family History   Problem Relation Age of Onset    Diabetes Mother         Insulin resistance    High Cholesterol Mother     Obesity Mother     High Cholesterol Father     Diabetes Maternal Grandmother     Heart Disease Maternal Grandmother     High Blood Pressure Maternal Grandfather     Cancer Paternal Grandmother

## 2024-06-12 NOTE — PROGRESS NOTES
[facility] [place] [facility]  [facility] EAR, NOSE AND THROAT  [address]  [address]  [facility] [address]  Dept: [phone]  Dept Fax: [phone]  Loc: [phone]    Ender Jacobo is a 19 y.o. male who was referred byNo ref. provider found for:  Chief Complaint   Patient presents with    Post-Op Check     Patient here for post op exam. Patient states that he is doing good since surgery and breathing better.   .    HPI:     Ender Jacobo is a 19 y.o. male who presents today for follow-up on surgery a couple of weeks ago.  The patient has some nasal congestion.  The pain medication is adequate.  They stopped using their Afrin and they are performing the buffered saline nasal irrigations.    Nasal airway is asked.  Facial pressure is gone.    History:     No Known Allergies  Current Outpatient Medications   Medication Sig Dispense Refill    metroNIDAZOLE (METROGEL) 0.75 % gel Apply topically 2 times daily. 45 g 0    glycopyrrolate (ROBINUL) 1 MG tablet Take 1 tablet by mouth 1 (one) time if needed (30 min prior to office visit) 5 tablet 0     No current facility-administered medications for this visit.     History reviewed. No pertinent past medical history.     Objective:   /78 (Site: Right Upper Arm, Position: Sitting)   Pulse 89   Temp 97.3 °F (36.3 °C) (Infrared)   Resp 18   Ht 1.727 m (5' 8\")   Wt 75.3 kg (166 lb)   SpO2 98%   BMI 25.24 kg/m²     Nose: Nose is decongested and anesthetized with topical sprays. Normal postoperative appearance.    Nasal cavity was topically anesthetized and decongested. Rigid nasal endoscopy was performed and there was no debris or inflammation.  Operated sinus drainage pathways are patent.  Mucosa appears to be healing well. The patient tolerated the procedure well.      Data:  All of the past medical history, past surgical history, family history,social history, allergies and current medications were reviewed with the

## (undated) DEVICE — GLOVE SURG SZ 7.5 L11.73IN FNGR THK9.8MIL STRW LTX POLYMER

## (undated) DEVICE — TURBINATOR WAND: Brand: COBLATION

## (undated) DEVICE — TUBING 1895522 5PK STRAIGHTSHOT TO XPS: Brand: STRAIGHTSHOT®

## (undated) DEVICE — GAUZE,SPONGE,8"X4",12PLY,XRAY,STRL,LF: Brand: MEDLINE

## (undated) DEVICE — SUTURE PLN GUT SZ 4-0 L18IN ABSRB YELLOWISH TAN L13MM SC-1 1828H

## (undated) DEVICE — DRESSING TRNSPAR W5XL4.5IN FLM SHT SEMIPERMEABLE WIND

## (undated) DEVICE — SUTURE VCRL SZ 3-0 L27IN ABSRB UD L26MM SH 1/2 CIR J416H

## (undated) DEVICE — BASIC SINGLE BASIN BTC-LF: Brand: MEDLINE INDUSTRIES, INC.

## (undated) DEVICE — PACK-MAJOR

## (undated) DEVICE — Z INACTIVE NO ACTIVE SUPPLIER APPLICATOR MEDICATED 26 CC TINT HI-LITE ORNG STRL CHLORAPREP

## (undated) DEVICE — AGENT HEMOSTATIC SURGIFLOW MATRIX KIT W/THROMBIN

## (undated) DEVICE — BLADE 1884002HRE M4 SERR 4MM ROTATE: Brand: STRAIGHTSHOT

## (undated) DEVICE — ENT: Brand: MEDLINE INDUSTRIES, INC.

## (undated) DEVICE — GAUZE,SPONGE,4"X4",12PLY,STERILE,LF,2'S: Brand: MEDLINE

## (undated) DEVICE — DRAPE,EXTREMITY,89X128,STERILE: Brand: MEDLINE